# Patient Record
Sex: MALE | Race: WHITE | Employment: OTHER | URBAN - METROPOLITAN AREA
[De-identification: names, ages, dates, MRNs, and addresses within clinical notes are randomized per-mention and may not be internally consistent; named-entity substitution may affect disease eponyms.]

---

## 2020-01-01 ENCOUNTER — TELEPHONE (OUTPATIENT)
Dept: ONCOLOGY | Age: 85
End: 2020-01-01

## 2020-01-01 ENCOUNTER — APPOINTMENT (OUTPATIENT)
Dept: MRI IMAGING | Age: 85
DRG: 871 | End: 2020-01-01
Attending: NURSE PRACTITIONER
Payer: MEDICARE

## 2020-01-01 ENCOUNTER — APPOINTMENT (OUTPATIENT)
Dept: CT IMAGING | Age: 85
DRG: 871 | End: 2020-01-01
Attending: NURSE PRACTITIONER
Payer: MEDICARE

## 2020-01-01 ENCOUNTER — APPOINTMENT (OUTPATIENT)
Dept: GENERAL RADIOLOGY | Age: 85
DRG: 871 | End: 2020-01-01
Attending: STUDENT IN AN ORGANIZED HEALTH CARE EDUCATION/TRAINING PROGRAM
Payer: MEDICARE

## 2020-01-01 ENCOUNTER — HOSPITAL ENCOUNTER (INPATIENT)
Age: 85
LOS: 12 days | Discharge: NURSING, REHAB OR LONG-TERM CARE | DRG: 871 | End: 2020-09-08
Attending: EMERGENCY MEDICINE | Admitting: INTERNAL MEDICINE
Payer: MEDICARE

## 2020-01-01 ENCOUNTER — APPOINTMENT (OUTPATIENT)
Dept: CT IMAGING | Age: 85
DRG: 871 | End: 2020-01-01
Attending: STUDENT IN AN ORGANIZED HEALTH CARE EDUCATION/TRAINING PROGRAM
Payer: MEDICARE

## 2020-01-01 ENCOUNTER — OFFICE VISIT (OUTPATIENT)
Dept: NEUROLOGY | Age: 85
End: 2020-01-01

## 2020-01-01 VITALS
TEMPERATURE: 97.8 F | HEART RATE: 99 BPM | WEIGHT: 195 LBS | DIASTOLIC BLOOD PRESSURE: 69 MMHG | SYSTOLIC BLOOD PRESSURE: 135 MMHG | RESPIRATION RATE: 20 BRPM | OXYGEN SATURATION: 95 % | HEIGHT: 71 IN | BODY MASS INDEX: 27.3 KG/M2

## 2020-01-01 VITALS
OXYGEN SATURATION: 97 % | HEART RATE: 70 BPM | DIASTOLIC BLOOD PRESSURE: 78 MMHG | SYSTOLIC BLOOD PRESSURE: 124 MMHG | HEIGHT: 71 IN

## 2020-01-01 DIAGNOSIS — M89.9 LESION OF LUMBAR SPINE: ICD-10-CM

## 2020-01-01 DIAGNOSIS — R10.31 ABDOMINAL PAIN, RIGHT LOWER QUADRANT: ICD-10-CM

## 2020-01-01 DIAGNOSIS — M89.9 LYTIC BONE LESIONS ON XRAY: Primary | ICD-10-CM

## 2020-01-01 DIAGNOSIS — N28.89 RIGHT RENAL MASS: ICD-10-CM

## 2020-01-01 DIAGNOSIS — G60.0 CHARCOT-MARIE-TOOTH DISEASE TYPE 2: ICD-10-CM

## 2020-01-01 DIAGNOSIS — A41.9 SEPSIS, DUE TO UNSPECIFIED ORGANISM, UNSPECIFIED WHETHER ACUTE ORGAN DYSFUNCTION PRESENT (HCC): ICD-10-CM

## 2020-01-01 DIAGNOSIS — R53.1 LEFT-SIDED WEAKNESS: ICD-10-CM

## 2020-01-01 DIAGNOSIS — N28.89 KIDNEY MASS: ICD-10-CM
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
## 2020-01-01 DIAGNOSIS — R50.9 FEVER, UNSPECIFIED FEVER CAUSE: ICD-10-CM

## 2020-01-01 DIAGNOSIS — N30.01 ACUTE CYSTITIS WITH HEMATURIA: ICD-10-CM

## 2020-01-01 DIAGNOSIS — J18.9 PNEUMONIA OF BOTH LUNGS DUE TO INFECTIOUS ORGANISM, UNSPECIFIED PART OF LUNG: ICD-10-CM

## 2020-01-01 DIAGNOSIS — N28.89 RENAL MASS, RIGHT: ICD-10-CM

## 2020-01-01 DIAGNOSIS — G60.0 CMT (CHARCOT-MARIE-TOOTH DISEASE): Primary | ICD-10-CM

## 2020-01-01 DIAGNOSIS — M89.9 BONE LESION: ICD-10-CM

## 2020-01-01 DIAGNOSIS — R16.0 LIVER MASS: ICD-10-CM

## 2020-01-01 DIAGNOSIS — J18.9 COMMUNITY ACQUIRED BILATERAL LOWER LOBE PNEUMONIA: Primary | ICD-10-CM

## 2020-01-01 DIAGNOSIS — N28.89 RENAL MASS: ICD-10-CM

## 2020-01-01 LAB
ALBUMIN SERPL-MCNC: 1.9 G/DL (ref 3.5–5)
ALBUMIN SERPL-MCNC: 2.3 G/DL (ref 3.5–5)
ALBUMIN SERPL-MCNC: 2.3 G/DL (ref 3.5–5)
ALBUMIN SERPL-MCNC: 2.6 G/DL (ref 3.5–5)
ALBUMIN/GLOB SERPL: 0.4 {RATIO} (ref 1.1–2.2)
ALBUMIN/GLOB SERPL: 0.5 {RATIO} (ref 1.1–2.2)
ALP SERPL-CCNC: 117 U/L (ref 45–117)
ALP SERPL-CCNC: 118 U/L (ref 45–117)
ALP SERPL-CCNC: 133 U/L (ref 45–117)
ALP SERPL-CCNC: 137 U/L (ref 45–117)
ALT SERPL-CCNC: 37 U/L (ref 12–78)
ALT SERPL-CCNC: 38 U/L (ref 12–78)
ALT SERPL-CCNC: 59 U/L (ref 12–78)
ALT SERPL-CCNC: 61 U/L (ref 12–78)
ANION GAP SERPL CALC-SCNC: 10 MMOL/L (ref 5–15)
ANION GAP SERPL CALC-SCNC: 11 MMOL/L (ref 5–15)
ANION GAP SERPL CALC-SCNC: 7 MMOL/L (ref 5–15)
ANION GAP SERPL CALC-SCNC: 8 MMOL/L (ref 5–15)
ANION GAP SERPL CALC-SCNC: 9 MMOL/L (ref 5–15)
ANION GAP SERPL CALC-SCNC: 9 MMOL/L (ref 5–15)
APPEARANCE UR: ABNORMAL
AST SERPL-CCNC: 43 U/L (ref 15–37)
AST SERPL-CCNC: 47 U/L (ref 15–37)
AST SERPL-CCNC: 47 U/L (ref 15–37)
AST SERPL-CCNC: 88 U/L (ref 15–37)
ATRIAL RATE: 114 BPM
B PERT DNA SPEC QL NAA+PROBE: NOT DETECTED
BACTERIA SPEC CULT: ABNORMAL
BACTERIA SPEC CULT: NORMAL
BACTERIA URNS QL MICRO: ABNORMAL /HPF
BASOPHILS # BLD: 0 K/UL (ref 0–0.1)
BASOPHILS # BLD: 0.1 K/UL (ref 0–0.1)
BASOPHILS # BLD: 0.1 K/UL (ref 0–0.1)
BASOPHILS NFR BLD: 0 % (ref 0–1)
BILIRUB SERPL-MCNC: 1.2 MG/DL (ref 0.2–1)
BILIRUB SERPL-MCNC: 1.5 MG/DL (ref 0.2–1)
BILIRUB SERPL-MCNC: 1.8 MG/DL (ref 0.2–1)
BILIRUB SERPL-MCNC: 1.9 MG/DL (ref 0.2–1)
BILIRUB UR QL: NEGATIVE
BORDETELLA PARAPERTUSSIS PCR, BORPAR: NOT DETECTED
BUN SERPL-MCNC: 23 MG/DL (ref 6–20)
BUN SERPL-MCNC: 28 MG/DL (ref 6–20)
BUN SERPL-MCNC: 30 MG/DL (ref 6–20)
BUN SERPL-MCNC: 33 MG/DL (ref 6–20)
BUN SERPL-MCNC: 37 MG/DL (ref 6–20)
BUN SERPL-MCNC: 44 MG/DL (ref 6–20)
BUN/CREAT SERPL: 38 (ref 12–20)
BUN/CREAT SERPL: 40 (ref 12–20)
BUN/CREAT SERPL: 43 (ref 12–20)
BUN/CREAT SERPL: 44 (ref 12–20)
BUN/CREAT SERPL: 44 (ref 12–20)
BUN/CREAT SERPL: 47 (ref 12–20)
C PNEUM DNA SPEC QL NAA+PROBE: NOT DETECTED
CALCIUM SERPL-MCNC: 8.2 MG/DL (ref 8.5–10.1)
CALCIUM SERPL-MCNC: 8.2 MG/DL (ref 8.5–10.1)
CALCIUM SERPL-MCNC: 8.4 MG/DL (ref 8.5–10.1)
CALCIUM SERPL-MCNC: 8.4 MG/DL (ref 8.5–10.1)
CALCIUM SERPL-MCNC: 8.5 MG/DL (ref 8.5–10.1)
CALCIUM SERPL-MCNC: 8.9 MG/DL (ref 8.5–10.1)
CALCULATED P AXIS, ECG09: 30 DEGREES
CALCULATED R AXIS, ECG10: -57 DEGREES
CALCULATED T AXIS, ECG11: 87 DEGREES
CC UR VC: ABNORMAL
CHLORIDE SERPL-SCNC: 103 MMOL/L (ref 97–108)
CHLORIDE SERPL-SCNC: 105 MMOL/L (ref 97–108)
CHLORIDE SERPL-SCNC: 107 MMOL/L (ref 97–108)
CHLORIDE SERPL-SCNC: 108 MMOL/L (ref 97–108)
CHLORIDE SERPL-SCNC: 109 MMOL/L (ref 97–108)
CHLORIDE SERPL-SCNC: 110 MMOL/L (ref 97–108)
CK SERPL-CCNC: 73 U/L (ref 39–308)
CO2 SERPL-SCNC: 20 MMOL/L (ref 21–32)
CO2 SERPL-SCNC: 20 MMOL/L (ref 21–32)
CO2 SERPL-SCNC: 21 MMOL/L (ref 21–32)
CO2 SERPL-SCNC: 23 MMOL/L (ref 21–32)
COLOR UR: ABNORMAL
COMMENT, HOLDF: NORMAL
COVID-19, XGCOVT: NOT DETECTED
COVID-19, XGCOVT: NOT DETECTED
CREAT SERPL-MCNC: 0.52 MG/DL (ref 0.7–1.3)
CREAT SERPL-MCNC: 0.6 MG/DL (ref 0.7–1.3)
CREAT SERPL-MCNC: 0.68 MG/DL (ref 0.7–1.3)
CREAT SERPL-MCNC: 0.82 MG/DL (ref 0.7–1.3)
CREAT SERPL-MCNC: 0.86 MG/DL (ref 0.7–1.3)
CREAT SERPL-MCNC: 1.16 MG/DL (ref 0.7–1.3)
DIAGNOSIS, 93000: NORMAL
DIFFERENTIAL METHOD BLD: ABNORMAL
EOSINOPHIL # BLD: 0 K/UL (ref 0–0.4)
EOSINOPHIL NFR BLD: 0 % (ref 0–7)
EPITH CASTS URNS QL MICRO: ABNORMAL /LPF
ERYTHROCYTE [DISTWIDTH] IN BLOOD BY AUTOMATED COUNT: 14.4 % (ref 11.5–14.5)
ERYTHROCYTE [DISTWIDTH] IN BLOOD BY AUTOMATED COUNT: 14.6 % (ref 11.5–14.5)
ERYTHROCYTE [DISTWIDTH] IN BLOOD BY AUTOMATED COUNT: 14.6 % (ref 11.5–14.5)
ERYTHROCYTE [DISTWIDTH] IN BLOOD BY AUTOMATED COUNT: 14.7 % (ref 11.5–14.5)
ERYTHROCYTE [DISTWIDTH] IN BLOOD BY AUTOMATED COUNT: 14.8 % (ref 11.5–14.5)
ERYTHROCYTE [DISTWIDTH] IN BLOOD BY AUTOMATED COUNT: 15.1 % (ref 11.5–14.5)
ERYTHROCYTE [DISTWIDTH] IN BLOOD BY AUTOMATED COUNT: 15.3 % (ref 11.5–14.5)
FLUAV H1 2009 PAND RNA SPEC QL NAA+PROBE: NOT DETECTED
FLUAV H1 RNA SPEC QL NAA+PROBE: NOT DETECTED
FLUAV H3 RNA SPEC QL NAA+PROBE: NOT DETECTED
FLUAV SUBTYP SPEC NAA+PROBE: NOT DETECTED
FLUBV RNA SPEC QL NAA+PROBE: NOT DETECTED
FLUID CULTURE, SPNG2: NORMAL
GLOBULIN SER CALC-MCNC: 4.3 G/DL (ref 2–4)
GLOBULIN SER CALC-MCNC: 4.4 G/DL (ref 2–4)
GLOBULIN SER CALC-MCNC: 4.8 G/DL (ref 2–4)
GLOBULIN SER CALC-MCNC: 5.1 G/DL (ref 2–4)
GLUCOSE SERPL-MCNC: 102 MG/DL (ref 65–100)
GLUCOSE SERPL-MCNC: 121 MG/DL (ref 65–100)
GLUCOSE SERPL-MCNC: 123 MG/DL (ref 65–100)
GLUCOSE SERPL-MCNC: 132 MG/DL (ref 65–100)
GLUCOSE SERPL-MCNC: 133 MG/DL (ref 65–100)
GLUCOSE SERPL-MCNC: 139 MG/DL (ref 65–100)
GLUCOSE UR STRIP.AUTO-MCNC: NEGATIVE MG/DL
GRAM STN SPEC: NORMAL
HADV DNA SPEC QL NAA+PROBE: NOT DETECTED
HCOV 229E RNA SPEC QL NAA+PROBE: NOT DETECTED
HCOV HKU1 RNA SPEC QL NAA+PROBE: NOT DETECTED
HCOV NL63 RNA SPEC QL NAA+PROBE: NOT DETECTED
HCOV OC43 RNA SPEC QL NAA+PROBE: NOT DETECTED
HCT VFR BLD AUTO: 38 % (ref 36.6–50.3)
HCT VFR BLD AUTO: 38.5 % (ref 36.6–50.3)
HCT VFR BLD AUTO: 40.8 % (ref 36.6–50.3)
HCT VFR BLD AUTO: 40.9 % (ref 36.6–50.3)
HCT VFR BLD AUTO: 41.6 % (ref 36.6–50.3)
HCT VFR BLD AUTO: 44.3 % (ref 36.6–50.3)
HCT VFR BLD AUTO: 44.5 % (ref 36.6–50.3)
HEALTH STATUS, XMCV2T: NORMAL
HGB BLD-MCNC: 12.8 G/DL (ref 12.1–17)
HGB BLD-MCNC: 12.9 G/DL (ref 12.1–17)
HGB BLD-MCNC: 13.5 G/DL (ref 12.1–17)
HGB BLD-MCNC: 13.7 G/DL (ref 12.1–17)
HGB BLD-MCNC: 14 G/DL (ref 12.1–17)
HGB BLD-MCNC: 14.8 G/DL (ref 12.1–17)
HGB BLD-MCNC: 15.3 G/DL (ref 12.1–17)
HGB UR QL STRIP: ABNORMAL
HMPV RNA SPEC QL NAA+PROBE: NOT DETECTED
HPIV1 RNA SPEC QL NAA+PROBE: NOT DETECTED
HPIV2 RNA SPEC QL NAA+PROBE: NOT DETECTED
HPIV3 RNA SPEC QL NAA+PROBE: NOT DETECTED
HPIV4 RNA SPEC QL NAA+PROBE: NOT DETECTED
IMM GRANULOCYTES # BLD AUTO: 0 K/UL
IMM GRANULOCYTES # BLD AUTO: 0.3 K/UL (ref 0–0.04)
IMM GRANULOCYTES # BLD AUTO: 0.3 K/UL (ref 0–0.04)
IMM GRANULOCYTES NFR BLD AUTO: 0 %
IMM GRANULOCYTES NFR BLD AUTO: 2 % (ref 0–0.5)
IMM GRANULOCYTES NFR BLD AUTO: 2 % (ref 0–0.5)
KETONES UR QL STRIP.AUTO: 80 MG/DL
L PNEUMO1 AG UR QL IA: NEGATIVE
LACTATE BLD-SCNC: 1.83 MMOL/L (ref 0.4–2)
LEUKOCYTE ESTERASE UR QL STRIP.AUTO: ABNORMAL
LIPASE SERPL-CCNC: 22 U/L (ref 73–393)
LYMPHOCYTES # BLD: 0.7 K/UL (ref 0.8–3.5)
LYMPHOCYTES # BLD: 0.9 K/UL (ref 0.8–3.5)
LYMPHOCYTES # BLD: 0.9 K/UL (ref 0.8–3.5)
LYMPHOCYTES # BLD: 1 K/UL (ref 0.8–3.5)
LYMPHOCYTES # BLD: 1 K/UL (ref 0.8–3.5)
LYMPHOCYTES # BLD: 1.1 K/UL (ref 0.8–3.5)
LYMPHOCYTES # BLD: 1.1 K/UL (ref 0.8–3.5)
LYMPHOCYTES NFR BLD: 4 % (ref 12–49)
LYMPHOCYTES NFR BLD: 5 % (ref 12–49)
LYMPHOCYTES NFR BLD: 6 % (ref 12–49)
LYMPHOCYTES NFR BLD: 8 % (ref 12–49)
LYMPHOCYTES NFR BLD: 9 % (ref 12–49)
M PNEUMO DNA SPEC QL NAA+PROBE: NOT DETECTED
M PNEUMO IGM SER IA-ACNC: NONREACTIVE
MAGNESIUM SERPL-MCNC: 2.4 MG/DL (ref 1.6–2.4)
MCH RBC QN AUTO: 30.8 PG (ref 26–34)
MCH RBC QN AUTO: 31.1 PG (ref 26–34)
MCH RBC QN AUTO: 31.2 PG (ref 26–34)
MCH RBC QN AUTO: 31.2 PG (ref 26–34)
MCH RBC QN AUTO: 31.3 PG (ref 26–34)
MCH RBC QN AUTO: 31.6 PG (ref 26–34)
MCH RBC QN AUTO: 31.6 PG (ref 26–34)
MCHC RBC AUTO-ENTMCNC: 33 G/DL (ref 30–36.5)
MCHC RBC AUTO-ENTMCNC: 33.4 G/DL (ref 30–36.5)
MCHC RBC AUTO-ENTMCNC: 33.5 G/DL (ref 30–36.5)
MCHC RBC AUTO-ENTMCNC: 33.6 G/DL (ref 30–36.5)
MCHC RBC AUTO-ENTMCNC: 33.7 G/DL (ref 30–36.5)
MCHC RBC AUTO-ENTMCNC: 33.7 G/DL (ref 30–36.5)
MCHC RBC AUTO-ENTMCNC: 34.4 G/DL (ref 30–36.5)
MCV RBC AUTO: 91.9 FL (ref 80–99)
MCV RBC AUTO: 92.5 FL (ref 80–99)
MCV RBC AUTO: 92.9 FL (ref 80–99)
MCV RBC AUTO: 92.9 FL (ref 80–99)
MCV RBC AUTO: 93.2 FL (ref 80–99)
MCV RBC AUTO: 93.5 FL (ref 80–99)
MCV RBC AUTO: 94.4 FL (ref 80–99)
MONOCYTES # BLD: 0.3 K/UL (ref 0–1)
MONOCYTES # BLD: 0.5 K/UL (ref 0–1)
MONOCYTES # BLD: 0.8 K/UL (ref 0–1)
MONOCYTES # BLD: 0.9 K/UL (ref 0–1)
MONOCYTES # BLD: 1.2 K/UL (ref 0–1)
MONOCYTES # BLD: 1.4 K/UL (ref 0–1)
MONOCYTES # BLD: 1.7 K/UL (ref 0–1)
MONOCYTES NFR BLD: 10 % (ref 5–13)
MONOCYTES NFR BLD: 10 % (ref 5–13)
MONOCYTES NFR BLD: 2 % (ref 5–13)
MONOCYTES NFR BLD: 3 % (ref 5–13)
MONOCYTES NFR BLD: 4 % (ref 5–13)
MONOCYTES NFR BLD: 6 % (ref 5–13)
MONOCYTES NFR BLD: 8 % (ref 5–13)
MYELOCYTES NFR BLD MANUAL: 1 %
NEUTS BAND NFR BLD MANUAL: 1 % (ref 0–6)
NEUTS BAND NFR BLD MANUAL: 1 % (ref 0–6)
NEUTS BAND NFR BLD MANUAL: 3 % (ref 0–6)
NEUTS BAND NFR BLD MANUAL: 3 % (ref 0–6)
NEUTS BAND NFR BLD MANUAL: 6 % (ref 0–6)
NEUTS SEG # BLD: 11.9 K/UL (ref 1.8–8)
NEUTS SEG # BLD: 13.1 K/UL (ref 1.8–8)
NEUTS SEG # BLD: 13.5 K/UL (ref 1.8–8)
NEUTS SEG # BLD: 14.4 K/UL (ref 1.8–8)
NEUTS SEG # BLD: 17 K/UL (ref 1.8–8)
NEUTS SEG # BLD: 17.1 K/UL (ref 1.8–8)
NEUTS SEG # BLD: 9.4 K/UL (ref 1.8–8)
NEUTS SEG NFR BLD: 80 % (ref 32–75)
NEUTS SEG NFR BLD: 82 % (ref 32–75)
NEUTS SEG NFR BLD: 85 % (ref 32–75)
NEUTS SEG NFR BLD: 86 % (ref 32–75)
NEUTS SEG NFR BLD: 87 % (ref 32–75)
NITRITE UR QL STRIP.AUTO: POSITIVE
NRBC # BLD: 0 K/UL (ref 0–0.01)
NRBC BLD-RTO: 0 PER 100 WBC
ORGANISM ID, SPNG3: NORMAL
P-R INTERVAL, ECG05: 128 MS
PH UR STRIP: 5.5 [PH] (ref 5–8)
PHOSPHATE SERPL-MCNC: 2.8 MG/DL (ref 2.6–4.7)
PLATELET # BLD AUTO: 112 K/UL (ref 150–400)
PLATELET # BLD AUTO: 121 K/UL (ref 150–400)
PLATELET # BLD AUTO: 160 K/UL (ref 150–400)
PLATELET # BLD AUTO: 266 K/UL (ref 150–400)
PLATELET # BLD AUTO: 395 K/UL (ref 150–400)
PLATELET # BLD AUTO: 480 K/UL (ref 150–400)
PLATELET # BLD AUTO: 99 K/UL (ref 150–400)
PLEASE NOTE, SPNG4: NORMAL
PMV BLD AUTO: 10.1 FL (ref 8.9–12.9)
PMV BLD AUTO: 10.2 FL (ref 8.9–12.9)
PMV BLD AUTO: 11 FL (ref 8.9–12.9)
PMV BLD AUTO: 11.6 FL (ref 8.9–12.9)
PMV BLD AUTO: 12 FL (ref 8.9–12.9)
PMV BLD AUTO: 12.2 FL (ref 8.9–12.9)
PMV BLD AUTO: 12.2 FL (ref 8.9–12.9)
POTASSIUM SERPL-SCNC: 3.3 MMOL/L (ref 3.5–5.1)
POTASSIUM SERPL-SCNC: 3.4 MMOL/L (ref 3.5–5.1)
POTASSIUM SERPL-SCNC: 3.5 MMOL/L (ref 3.5–5.1)
POTASSIUM SERPL-SCNC: 3.5 MMOL/L (ref 3.5–5.1)
POTASSIUM SERPL-SCNC: 3.8 MMOL/L (ref 3.5–5.1)
POTASSIUM SERPL-SCNC: 3.9 MMOL/L (ref 3.5–5.1)
PROT SERPL-MCNC: 6.2 G/DL (ref 6.4–8.2)
PROT SERPL-MCNC: 6.7 G/DL (ref 6.4–8.2)
PROT SERPL-MCNC: 7.1 G/DL (ref 6.4–8.2)
PROT SERPL-MCNC: 7.7 G/DL (ref 6.4–8.2)
PROT UR STRIP-MCNC: 100 MG/DL
PSA SERPL-MCNC: 4.6 NG/ML (ref 0.01–4)
Q-T INTERVAL, ECG07: 326 MS
QRS DURATION, ECG06: 90 MS
QTC CALCULATION (BEZET), ECG08: 449 MS
RBC # BLD AUTO: 4.08 M/UL (ref 4.1–5.7)
RBC # BLD AUTO: 4.11 M/UL (ref 4.1–5.7)
RBC # BLD AUTO: 4.39 M/UL (ref 4.1–5.7)
RBC # BLD AUTO: 4.39 M/UL (ref 4.1–5.7)
RBC # BLD AUTO: 4.48 M/UL (ref 4.1–5.7)
RBC # BLD AUTO: 4.74 M/UL (ref 4.1–5.7)
RBC # BLD AUTO: 4.84 M/UL (ref 4.1–5.7)
RBC #/AREA URNS HPF: ABNORMAL /HPF (ref 0–5)
RBC MORPH BLD: ABNORMAL
RSV RNA SPEC QL NAA+PROBE: NOT DETECTED
RV+EV RNA SPEC QL NAA+PROBE: NOT DETECTED
S PNEUM AG SPEC QL LA: NEGATIVE
SAMPLES BEING HELD,HOLD: NORMAL
SARS-COV-2, COV2: NOT DETECTED
SARS-COV-2, COV2: NOT DETECTED
SERVICE CMNT-IMP: ABNORMAL
SERVICE CMNT-IMP: NORMAL
SODIUM SERPL-SCNC: 134 MMOL/L (ref 136–145)
SODIUM SERPL-SCNC: 135 MMOL/L (ref 136–145)
SODIUM SERPL-SCNC: 138 MMOL/L (ref 136–145)
SODIUM SERPL-SCNC: 139 MMOL/L (ref 136–145)
SODIUM SERPL-SCNC: 140 MMOL/L (ref 136–145)
SODIUM SERPL-SCNC: 140 MMOL/L (ref 136–145)
SOURCE, COVRS: NORMAL
SP GR UR REFRACTOMETRY: 1.02 (ref 1–1.03)
SPECIMEN SOURCE, FCOV2M: NORMAL
SPECIMEN SOURCE: NORMAL
SPECIMEN SOURCE: NORMAL
SPECIMEN TYPE, XMCV1T: NORMAL
SPECIMEN, SPNG1: NORMAL
TROPONIN I SERPL-MCNC: <0.05 NG/ML
UR CULT HOLD, URHOLD: NORMAL
UROBILINOGEN UR QL STRIP.AUTO: 1 EU/DL (ref 0.2–1)
VENTRICULAR RATE, ECG03: 114 BPM
WBC # BLD AUTO: 11.6 K/UL (ref 4.1–11.1)
WBC # BLD AUTO: 13.3 K/UL (ref 4.1–11.1)
WBC # BLD AUTO: 15.1 K/UL (ref 4.1–11.1)
WBC # BLD AUTO: 16.5 K/UL (ref 4.1–11.1)
WBC # BLD AUTO: 17.1 K/UL (ref 4.1–11.1)
WBC # BLD AUTO: 18.3 K/UL (ref 4.1–11.1)
WBC # BLD AUTO: 18.9 K/UL (ref 4.1–11.1)
WBC URNS QL MICRO: ABNORMAL /HPF (ref 0–4)

## 2020-01-01 PROCEDURE — 65660000000 HC RM CCU STEPDOWN

## 2020-01-01 PROCEDURE — 74170 CT ABD WO CNTRST FLWD CNTRST: CPT

## 2020-01-01 PROCEDURE — 74011000250 HC RX REV CODE- 250: Performed by: INTERNAL MEDICINE

## 2020-01-01 PROCEDURE — 96365 THER/PROPH/DIAG IV INF INIT: CPT

## 2020-01-01 PROCEDURE — 87635 SARS-COV-2 COVID-19 AMP PRB: CPT

## 2020-01-01 PROCEDURE — 97530 THERAPEUTIC ACTIVITIES: CPT

## 2020-01-01 PROCEDURE — 77030040831 HC BAG URINE DRNG MDII -A

## 2020-01-01 PROCEDURE — 74011250637 HC RX REV CODE- 250/637: Performed by: NURSE PRACTITIONER

## 2020-01-01 PROCEDURE — 77030041074 HC DRSG AG OPTIFRM MDII -A

## 2020-01-01 PROCEDURE — 87040 BLOOD CULTURE FOR BACTERIA: CPT

## 2020-01-01 PROCEDURE — 83605 ASSAY OF LACTIC ACID: CPT

## 2020-01-01 PROCEDURE — 87070 CULTURE OTHR SPECIMN AEROBIC: CPT

## 2020-01-01 PROCEDURE — 74011000258 HC RX REV CODE- 258: Performed by: INTERNAL MEDICINE

## 2020-01-01 PROCEDURE — 71045 X-RAY EXAM CHEST 1 VIEW: CPT

## 2020-01-01 PROCEDURE — 87449 NOS EACH ORGANISM AG IA: CPT

## 2020-01-01 PROCEDURE — 85025 COMPLETE CBC W/AUTO DIFF WBC: CPT

## 2020-01-01 PROCEDURE — 74011250637 HC RX REV CODE- 250/637: Performed by: INTERNAL MEDICINE

## 2020-01-01 PROCEDURE — 74011250636 HC RX REV CODE- 250/636: Performed by: INTERNAL MEDICINE

## 2020-01-01 PROCEDURE — 80053 COMPREHEN METABOLIC PANEL: CPT

## 2020-01-01 PROCEDURE — 74011250637 HC RX REV CODE- 250/637: Performed by: FAMILY MEDICINE

## 2020-01-01 PROCEDURE — 99285 EMERGENCY DEPT VISIT HI MDM: CPT

## 2020-01-01 PROCEDURE — 77030038269 HC DRN EXT URIN PURWCK BARD -A

## 2020-01-01 PROCEDURE — 36415 COLL VENOUS BLD VENIPUNCTURE: CPT

## 2020-01-01 PROCEDURE — 80048 BASIC METABOLIC PNL TOTAL CA: CPT

## 2020-01-01 PROCEDURE — 97165 OT EVAL LOW COMPLEX 30 MIN: CPT

## 2020-01-01 PROCEDURE — 99232 SBSQ HOSP IP/OBS MODERATE 35: CPT | Performed by: INTERNAL MEDICINE

## 2020-01-01 PROCEDURE — 74011000636 HC RX REV CODE- 636: Performed by: RADIOLOGY

## 2020-01-01 PROCEDURE — 87899 AGENT NOS ASSAY W/OPTIC: CPT

## 2020-01-01 PROCEDURE — 81001 URINALYSIS AUTO W/SCOPE: CPT

## 2020-01-01 PROCEDURE — 65270000029 HC RM PRIVATE

## 2020-01-01 PROCEDURE — 83690 ASSAY OF LIPASE: CPT

## 2020-01-01 PROCEDURE — 77010033678 HC OXYGEN DAILY

## 2020-01-01 PROCEDURE — 82550 ASSAY OF CK (CPK): CPT

## 2020-01-01 PROCEDURE — 83735 ASSAY OF MAGNESIUM: CPT

## 2020-01-01 PROCEDURE — 87086 URINE CULTURE/COLONY COUNT: CPT

## 2020-01-01 PROCEDURE — 84484 ASSAY OF TROPONIN QUANT: CPT

## 2020-01-01 PROCEDURE — 70450 CT HEAD/BRAIN W/O DYE: CPT

## 2020-01-01 PROCEDURE — 74011250636 HC RX REV CODE- 250/636: Performed by: STUDENT IN AN ORGANIZED HEALTH CARE EDUCATION/TRAINING PROGRAM

## 2020-01-01 PROCEDURE — 84100 ASSAY OF PHOSPHORUS: CPT

## 2020-01-01 PROCEDURE — 97162 PT EVAL MOD COMPLEX 30 MIN: CPT

## 2020-01-01 PROCEDURE — 99223 1ST HOSP IP/OBS HIGH 75: CPT | Performed by: INTERNAL MEDICINE

## 2020-01-01 PROCEDURE — 74011000258 HC RX REV CODE- 258: Performed by: STUDENT IN AN ORGANIZED HEALTH CARE EDUCATION/TRAINING PROGRAM

## 2020-01-01 PROCEDURE — 84153 ASSAY OF PSA TOTAL: CPT

## 2020-01-01 PROCEDURE — 86738 MYCOPLASMA ANTIBODY: CPT

## 2020-01-01 PROCEDURE — 0100U RESPIRATORY PANEL,PCR,NASOPHARYNGEAL: CPT

## 2020-01-01 PROCEDURE — 77030041247 HC PROTECTOR HEEL HEELMEDIX MDII -B

## 2020-01-01 PROCEDURE — 93005 ELECTROCARDIOGRAM TRACING: CPT

## 2020-01-01 PROCEDURE — 87186 SC STD MICRODIL/AGAR DIL: CPT

## 2020-01-01 PROCEDURE — 71260 CT THORAX DX C+: CPT

## 2020-01-01 PROCEDURE — 97110 THERAPEUTIC EXERCISES: CPT

## 2020-01-01 PROCEDURE — 87077 CULTURE AEROBIC IDENTIFY: CPT

## 2020-01-01 RX ORDER — AMOXICILLIN 250 MG
1 CAPSULE ORAL
Status: DISCONTINUED | OUTPATIENT
Start: 2020-01-01 | End: 2020-01-01 | Stop reason: HOSPADM

## 2020-01-01 RX ORDER — POTASSIUM CHLORIDE 750 MG/1
40 TABLET, FILM COATED, EXTENDED RELEASE ORAL
Status: COMPLETED | OUTPATIENT
Start: 2020-01-01 | End: 2020-01-01

## 2020-01-01 RX ORDER — ENOXAPARIN SODIUM 100 MG/ML
40 INJECTION SUBCUTANEOUS DAILY
Status: DISCONTINUED | OUTPATIENT
Start: 2020-01-01 | End: 2020-01-01

## 2020-01-01 RX ORDER — ACETAMINOPHEN 650 MG/1
650 SUPPOSITORY RECTAL
Status: DISCONTINUED | OUTPATIENT
Start: 2020-01-01 | End: 2020-01-01

## 2020-01-01 RX ORDER — LEVOFLOXACIN 5 MG/ML
750 INJECTION, SOLUTION INTRAVENOUS ONCE
Status: COMPLETED | OUTPATIENT
Start: 2020-01-01 | End: 2020-01-01

## 2020-01-01 RX ORDER — POLYETHYLENE GLYCOL 3350 17 G/17G
17 POWDER, FOR SOLUTION ORAL DAILY PRN
Status: DISCONTINUED | OUTPATIENT
Start: 2020-01-01 | End: 2020-01-01 | Stop reason: HOSPADM

## 2020-01-01 RX ORDER — LOVASTATIN 20 MG/1
20 TABLET ORAL
COMMUNITY
End: 2020-01-01

## 2020-01-01 RX ORDER — LEVOFLOXACIN 750 MG/1
750 TABLET ORAL DAILY
Qty: 2 TAB | Refills: 0 | Status: SHIPPED | OUTPATIENT
Start: 2020-01-01 | End: 2020-01-01 | Stop reason: SDUPTHER

## 2020-01-01 RX ORDER — LEVOFLOXACIN 750 MG/1
750 TABLET ORAL DAILY
Qty: 2 TAB | Refills: 0 | Status: SHIPPED | OUTPATIENT
Start: 2020-01-01

## 2020-01-01 RX ORDER — ONDANSETRON 2 MG/ML
4 INJECTION INTRAMUSCULAR; INTRAVENOUS
Status: DISCONTINUED | OUTPATIENT
Start: 2020-01-01 | End: 2020-01-01 | Stop reason: HOSPADM

## 2020-01-01 RX ORDER — ENOXAPARIN SODIUM 100 MG/ML
40 INJECTION SUBCUTANEOUS EVERY 24 HOURS
Status: DISCONTINUED | OUTPATIENT
Start: 2020-01-01 | End: 2020-01-01 | Stop reason: HOSPADM

## 2020-01-01 RX ORDER — LEVOFLOXACIN 750 MG/1
750 TABLET ORAL DAILY
Qty: 3 TAB | Refills: 0 | Status: SHIPPED | OUTPATIENT
Start: 2020-01-01 | End: 2020-01-01 | Stop reason: SDUPTHER

## 2020-01-01 RX ORDER — ACETAMINOPHEN 325 MG/1
650 TABLET ORAL
Status: DISCONTINUED | OUTPATIENT
Start: 2020-01-01 | End: 2020-01-01 | Stop reason: HOSPADM

## 2020-01-01 RX ORDER — CHOLECALCIFEROL (VITAMIN D3) 125 MCG
CAPSULE ORAL
COMMUNITY
End: 2020-01-01

## 2020-01-01 RX ORDER — FUROSEMIDE 10 MG/ML
40 INJECTION INTRAMUSCULAR; INTRAVENOUS ONCE
Status: COMPLETED | OUTPATIENT
Start: 2020-01-01 | End: 2020-01-01

## 2020-01-01 RX ORDER — PROMETHAZINE HYDROCHLORIDE 25 MG/1
12.5 TABLET ORAL
Status: DISCONTINUED | OUTPATIENT
Start: 2020-01-01 | End: 2020-01-01

## 2020-01-01 RX ORDER — DOCUSATE SODIUM 250 MG
250 CAPSULE ORAL DAILY
COMMUNITY
End: 2020-01-01

## 2020-01-01 RX ORDER — SODIUM CHLORIDE 0.9 % (FLUSH) 0.9 %
5-40 SYRINGE (ML) INJECTION AS NEEDED
Status: DISCONTINUED | OUTPATIENT
Start: 2020-01-01 | End: 2020-01-01 | Stop reason: HOSPADM

## 2020-01-01 RX ORDER — POTASSIUM CHLORIDE 750 MG/1
40 TABLET, FILM COATED, EXTENDED RELEASE ORAL EVERY 4 HOURS
Status: COMPLETED | OUTPATIENT
Start: 2020-01-01 | End: 2020-01-01

## 2020-01-01 RX ORDER — SODIUM CHLORIDE 0.9 % (FLUSH) 0.9 %
5-10 SYRINGE (ML) INJECTION AS NEEDED
Status: DISCONTINUED | OUTPATIENT
Start: 2020-01-01 | End: 2020-01-01 | Stop reason: HOSPADM

## 2020-01-01 RX ORDER — ASPIRIN 81 MG/1
81 TABLET ORAL DAILY
COMMUNITY
End: 2020-01-01

## 2020-01-01 RX ORDER — SODIUM CHLORIDE 0.9 % (FLUSH) 0.9 %
5-40 SYRINGE (ML) INJECTION EVERY 8 HOURS
Status: DISCONTINUED | OUTPATIENT
Start: 2020-01-01 | End: 2020-01-01 | Stop reason: HOSPADM

## 2020-01-01 RX ADMIN — Medication 10 ML: at 16:22

## 2020-01-01 RX ADMIN — WATER 2 G: 1 INJECTION INTRAMUSCULAR; INTRAVENOUS; SUBCUTANEOUS at 09:33

## 2020-01-01 RX ADMIN — Medication 10 ML: at 06:35

## 2020-01-01 RX ADMIN — ENOXAPARIN SODIUM 40 MG: 40 INJECTION SUBCUTANEOUS at 09:09

## 2020-01-01 RX ADMIN — DOCUSATE SODIUM 50MG AND SENNOSIDES 8.6MG 1 TABLET: 8.6; 5 TABLET, FILM COATED ORAL at 21:44

## 2020-01-01 RX ADMIN — Medication 10 ML: at 07:26

## 2020-01-01 RX ADMIN — Medication 10 ML: at 04:41

## 2020-01-01 RX ADMIN — WATER 2 G: 1 INJECTION INTRAMUSCULAR; INTRAVENOUS; SUBCUTANEOUS at 09:29

## 2020-01-01 RX ADMIN — Medication 10 ML: at 21:56

## 2020-01-01 RX ADMIN — Medication 10 ML: at 20:44

## 2020-01-01 RX ADMIN — PIPERACILLIN AND TAZOBACTAM 3.38 G: 3; .375 INJECTION, POWDER, LYOPHILIZED, FOR SOLUTION INTRAVENOUS at 04:14

## 2020-01-01 RX ADMIN — LEVOFLOXACIN 750 MG: 500 TABLET, FILM COATED ORAL at 06:51

## 2020-01-01 RX ADMIN — ACETAMINOPHEN 650 MG: 325 TABLET ORAL at 10:21

## 2020-01-01 RX ADMIN — Medication 10 ML: at 21:48

## 2020-01-01 RX ADMIN — FUROSEMIDE 40 MG: 10 INJECTION, SOLUTION INTRAMUSCULAR; INTRAVENOUS at 08:56

## 2020-01-01 RX ADMIN — Medication 20 ML: at 14:00

## 2020-01-01 RX ADMIN — DOCUSATE SODIUM 50MG AND SENNOSIDES 8.6MG 1 TABLET: 8.6; 5 TABLET, FILM COATED ORAL at 20:44

## 2020-01-01 RX ADMIN — DOXYCYCLINE 100 MG: 100 INJECTION, POWDER, LYOPHILIZED, FOR SOLUTION INTRAVENOUS at 20:25

## 2020-01-01 RX ADMIN — Medication 10 ML: at 06:00

## 2020-01-01 RX ADMIN — Medication 1 CAPSULE: at 08:25

## 2020-01-01 RX ADMIN — Medication 10 ML: at 05:22

## 2020-01-01 RX ADMIN — Medication 10 ML: at 13:01

## 2020-01-01 RX ADMIN — DOXYCYCLINE 100 MG: 100 INJECTION, POWDER, LYOPHILIZED, FOR SOLUTION INTRAVENOUS at 09:07

## 2020-01-01 RX ADMIN — DOXYCYCLINE 100 MG: 100 INJECTION, POWDER, LYOPHILIZED, FOR SOLUTION INTRAVENOUS at 21:36

## 2020-01-01 RX ADMIN — ENOXAPARIN SODIUM 40 MG: 40 INJECTION SUBCUTANEOUS at 09:03

## 2020-01-01 RX ADMIN — WATER 2 G: 1 INJECTION INTRAMUSCULAR; INTRAVENOUS; SUBCUTANEOUS at 08:51

## 2020-01-01 RX ADMIN — PIPERACILLIN AND TAZOBACTAM 3.38 G: 3; .375 INJECTION, POWDER, LYOPHILIZED, FOR SOLUTION INTRAVENOUS at 04:08

## 2020-01-01 RX ADMIN — DOCUSATE SODIUM 50MG AND SENNOSIDES 8.6MG 1 TABLET: 8.6; 5 TABLET, FILM COATED ORAL at 20:39

## 2020-01-01 RX ADMIN — PIPERACILLIN AND TAZOBACTAM 3.38 G: 3; .375 INJECTION, POWDER, LYOPHILIZED, FOR SOLUTION INTRAVENOUS at 18:39

## 2020-01-01 RX ADMIN — Medication 10 ML: at 21:40

## 2020-01-01 RX ADMIN — PIPERACILLIN AND TAZOBACTAM 3.38 G: 3; .375 INJECTION, POWDER, LYOPHILIZED, FOR SOLUTION INTRAVENOUS at 04:39

## 2020-01-01 RX ADMIN — IOPAMIDOL 100 ML: 755 INJECTION, SOLUTION INTRAVENOUS at 11:56

## 2020-01-01 RX ADMIN — WATER 2 G: 1 INJECTION INTRAMUSCULAR; INTRAVENOUS; SUBCUTANEOUS at 09:17

## 2020-01-01 RX ADMIN — DOXYCYCLINE 100 MG: 100 INJECTION, POWDER, LYOPHILIZED, FOR SOLUTION INTRAVENOUS at 09:03

## 2020-01-01 RX ADMIN — PIPERACILLIN AND TAZOBACTAM 3.38 G: 3; .375 INJECTION, POWDER, LYOPHILIZED, FOR SOLUTION INTRAVENOUS at 11:58

## 2020-01-01 RX ADMIN — Medication 10 ML: at 04:58

## 2020-01-01 RX ADMIN — Medication 10 ML: at 03:52

## 2020-01-01 RX ADMIN — ACETAMINOPHEN 650 MG: 325 TABLET ORAL at 19:39

## 2020-01-01 RX ADMIN — DOXYCYCLINE 100 MG: 100 INJECTION, POWDER, LYOPHILIZED, FOR SOLUTION INTRAVENOUS at 09:16

## 2020-01-01 RX ADMIN — DOXYCYCLINE 100 MG: 100 INJECTION, POWDER, LYOPHILIZED, FOR SOLUTION INTRAVENOUS at 21:28

## 2020-01-01 RX ADMIN — DOXYCYCLINE 100 MG: 100 INJECTION, POWDER, LYOPHILIZED, FOR SOLUTION INTRAVENOUS at 09:22

## 2020-01-01 RX ADMIN — Medication 10 ML: at 23:17

## 2020-01-01 RX ADMIN — DOXYCYCLINE 100 MG: 100 INJECTION, POWDER, LYOPHILIZED, FOR SOLUTION INTRAVENOUS at 22:47

## 2020-01-01 RX ADMIN — POTASSIUM CHLORIDE 40 MEQ: 750 TABLET, FILM COATED, EXTENDED RELEASE ORAL at 12:01

## 2020-01-01 RX ADMIN — Medication 10 ML: at 18:43

## 2020-01-01 RX ADMIN — DOCUSATE SODIUM 50MG AND SENNOSIDES 8.6MG 1 TABLET: 8.6; 5 TABLET, FILM COATED ORAL at 21:56

## 2020-01-01 RX ADMIN — POTASSIUM CHLORIDE 40 MEQ: 750 TABLET, FILM COATED, EXTENDED RELEASE ORAL at 11:00

## 2020-01-01 RX ADMIN — Medication 10 ML: at 21:27

## 2020-01-01 RX ADMIN — PIPERACILLIN AND TAZOBACTAM 3.38 G: 3; .375 INJECTION, POWDER, LYOPHILIZED, FOR SOLUTION INTRAVENOUS at 12:52

## 2020-01-01 RX ADMIN — SODIUM CHLORIDE 1000 ML: 900 INJECTION, SOLUTION INTRAVENOUS at 19:01

## 2020-01-01 RX ADMIN — Medication 10 ML: at 02:25

## 2020-01-01 RX ADMIN — Medication 10 ML: at 17:47

## 2020-01-01 RX ADMIN — WATER 2 G: 1 INJECTION INTRAMUSCULAR; INTRAVENOUS; SUBCUTANEOUS at 11:03

## 2020-01-01 RX ADMIN — ENOXAPARIN SODIUM 40 MG: 40 INJECTION SUBCUTANEOUS at 09:42

## 2020-01-01 RX ADMIN — SODIUM CHLORIDE 1000 ML: 900 INJECTION, SOLUTION INTRAVENOUS at 19:54

## 2020-01-01 RX ADMIN — Medication 10 ML: at 05:12

## 2020-01-01 RX ADMIN — DOCUSATE SODIUM 50MG AND SENNOSIDES 8.6MG 1 TABLET: 8.6; 5 TABLET, FILM COATED ORAL at 22:47

## 2020-01-01 RX ADMIN — WATER 2 G: 1 INJECTION INTRAMUSCULAR; INTRAVENOUS; SUBCUTANEOUS at 09:22

## 2020-01-01 RX ADMIN — ENOXAPARIN SODIUM 40 MG: 40 INJECTION SUBCUTANEOUS at 10:05

## 2020-01-01 RX ADMIN — Medication 10 ML: at 21:36

## 2020-01-01 RX ADMIN — Medication 10 ML: at 05:17

## 2020-01-01 RX ADMIN — ENOXAPARIN SODIUM 40 MG: 40 INJECTION SUBCUTANEOUS at 09:17

## 2020-01-01 RX ADMIN — DOCUSATE SODIUM 50MG AND SENNOSIDES 8.6MG 1 TABLET: 8.6; 5 TABLET, FILM COATED ORAL at 21:40

## 2020-01-01 RX ADMIN — SODIUM CHLORIDE 517 ML: 900 INJECTION, SOLUTION INTRAVENOUS at 19:55

## 2020-01-01 RX ADMIN — Medication 10 ML: at 19:01

## 2020-01-01 RX ADMIN — Medication 10 ML: at 20:39

## 2020-01-01 RX ADMIN — PIPERACILLIN AND TAZOBACTAM 3.38 G: 3; .375 INJECTION, POWDER, LYOPHILIZED, FOR SOLUTION INTRAVENOUS at 23:07

## 2020-01-01 RX ADMIN — ENOXAPARIN SODIUM 40 MG: 40 INJECTION SUBCUTANEOUS at 10:31

## 2020-01-01 RX ADMIN — WATER 2 G: 1 INJECTION INTRAMUSCULAR; INTRAVENOUS; SUBCUTANEOUS at 08:40

## 2020-01-01 RX ADMIN — DOCUSATE SODIUM 50MG AND SENNOSIDES 8.6MG 1 TABLET: 8.6; 5 TABLET, FILM COATED ORAL at 22:07

## 2020-01-01 RX ADMIN — DOCUSATE SODIUM 50MG AND SENNOSIDES 8.6MG 1 TABLET: 8.6; 5 TABLET, FILM COATED ORAL at 21:36

## 2020-01-01 RX ADMIN — ACETAMINOPHEN 650 MG: 325 TABLET ORAL at 17:16

## 2020-01-01 RX ADMIN — ACETAMINOPHEN 650 MG: 325 TABLET ORAL at 18:54

## 2020-01-01 RX ADMIN — Medication 10 ML: at 13:21

## 2020-01-01 RX ADMIN — Medication 1 CAPSULE: at 11:53

## 2020-01-01 RX ADMIN — Medication 10 ML: at 06:39

## 2020-01-01 RX ADMIN — IOPAMIDOL 100 ML: 755 INJECTION, SOLUTION INTRAVENOUS at 19:54

## 2020-01-01 RX ADMIN — Medication 10 ML: at 22:07

## 2020-01-01 RX ADMIN — WATER 2 G: 1 INJECTION INTRAMUSCULAR; INTRAVENOUS; SUBCUTANEOUS at 09:09

## 2020-01-01 RX ADMIN — ACETAMINOPHEN 650 MG: 325 TABLET ORAL at 08:55

## 2020-01-01 RX ADMIN — PIPERACILLIN AND TAZOBACTAM 3.38 G: 3; .375 INJECTION, POWDER, LYOPHILIZED, FOR SOLUTION INTRAVENOUS at 21:04

## 2020-01-01 RX ADMIN — DOXYCYCLINE 100 MG: 100 INJECTION, POWDER, LYOPHILIZED, FOR SOLUTION INTRAVENOUS at 08:55

## 2020-01-01 RX ADMIN — Medication 10 ML: at 15:45

## 2020-01-01 RX ADMIN — Medication 10 ML: at 22:47

## 2020-01-01 RX ADMIN — WATER 2 G: 1 INJECTION INTRAMUSCULAR; INTRAVENOUS; SUBCUTANEOUS at 09:03

## 2020-01-01 RX ADMIN — Medication 10 ML: at 13:42

## 2020-01-01 RX ADMIN — DOXYCYCLINE 100 MG: 100 INJECTION, POWDER, LYOPHILIZED, FOR SOLUTION INTRAVENOUS at 23:16

## 2020-01-01 RX ADMIN — Medication 10 ML: at 09:57

## 2020-01-01 RX ADMIN — ENOXAPARIN SODIUM 40 MG: 40 INJECTION SUBCUTANEOUS at 09:30

## 2020-01-01 RX ADMIN — POLYETHYLENE GLYCOL 3350 17 G: 17 POWDER, FOR SOLUTION ORAL at 08:35

## 2020-01-01 RX ADMIN — LEVOFLOXACIN 750 MG: 5 INJECTION, SOLUTION INTRAVENOUS at 22:44

## 2020-01-01 RX ADMIN — Medication 10 ML: at 20:26

## 2020-01-01 RX ADMIN — ENOXAPARIN SODIUM 40 MG: 40 INJECTION SUBCUTANEOUS at 09:33

## 2020-01-01 RX ADMIN — POTASSIUM CHLORIDE 40 MEQ: 750 TABLET, FILM COATED, EXTENDED RELEASE ORAL at 16:55

## 2020-02-18 NOTE — PROGRESS NOTES
Neurology Note    Patient ID:  Atilio Posada  <[id number]>  33 y.o.  7/21/1933      Date of Consultation:  February 19, 2020    Referring Physician: Dr. Servando Pulido     Reason for Consultation:  neuropathy    Subjective: I have CMT       History of Present Illness:   Atilio Posada is a 80 y.o. male who was referred to the neurology clinic at [facility] for evaluation. From reviewing the records, the patient has a reported diagnosis of Charcot-Yasmin-Tooth and was a former patient at the [facility]. The patient presents with his daughter today who provides additional information. They report that prior to seeing the [facility], he was seen by Dr. Marley Dukes here in the [place] area. Ultimately went to the [facility] and was seen by Dr. Sumit Cao. This was in the early 2000's that he was ultimately diagnosed with Charcot-Yasmin-Tooth type II. This was diagnosed by electrodiagnostic studies. He did not have genetic testing performed. He was last seen in their clinic in 2010. They did bring copies of notes from 2008 and 2010 for my review. At that time he was already wearing AFOs to help with preventing falls. There was discussions about a motorized scooter but it does not appear that he had gotten 1. The patient states that his house has had significant modification and attempts to preventing falls. He does have a rolling walker. He also does use an office chair which he rolls around on. He does have a ramp to get in and out of the house and he does have a golf cart outside the house. He has had home safety evaluations and modifications that have been performed. His last fall was a few weeks ago. His falls are irregular and there is no one consistent theme that causes a fall. He can function independently in the bathroom and in the kitchen. He only uses the microwave to heat up food.   His daughter who lives nearby also provides some of the meals for him. He does have people who come by and help from a cleaning standpoint. It does seem like he is a very active individual and stays busy daily. The other thing from his medical history in regards to his neurological function that they wanted to mention is he did have cervical spine surgery performed in 2003. This was performed at [facility] as well. He denies any new numbness, tingling, or weakness. He has a persistent weakness in his legs which he feels has been relatively consistent. He does wear his AFOs and has not noticed any significant amount of skin breakdown. He does notice occasional erythema but this does resolve. Past medical history:    High cholesterol. Past Surgical History:   Procedure Laterality Date    HX ORTHOPAEDIC      NEUROLOGICAL PROCEDURE UNLISTED      prior c spine surgery    Family History   Problem Relation Age of Onset    Heart Disease Mother     Heart Disease Father     Parkinsonism Paternal Uncle         Social History     Tobacco Use    Smoking status: Never Smoker    Smokeless tobacco: Never Used   Substance Use Topics    Alcohol use: Not Currently        No Known Allergies     Prior to Admission medications    Medication Sig Start Date End Date Taking? Authorizing Provider   cholecalciferol, vitamin D3, (VITAMIN D3) 50 mcg (2,000 unit) tab Take  by mouth. Yes Provider, Historical   aspirin delayed-release (ECOTRIN LOW STRENGTH) 81 mg tablet Take  by mouth daily. Yes Provider, Historical   folic acid/multivit-min/lutein (CENTRUM SILVER PO) Take  by mouth. Yes Provider, Historical   lovastatin (MEVACOR) 20 mg tablet Take 20 mg by mouth nightly. Yes Provider, Historical   docusate sodium (DOK) 250 mg capsule Take 250 mg by mouth daily. Yes Provider, Historical       Review of Systems:    General, constitutional: falls, fatigue,   Eyes, vision: negative  Ears, nose, throat: hearing loss.    Cardiovascular, heart: negative  Respiratory: negative  Gastrointestinal: constipation  Genitourinary: negative  Musculoskeletal: negative  Skin and integumentary: negative  Psychiatric: negative  Endocrine: negative  Neurological: negative, except for HPI  Hematologic/lymphatic: negative  Allergy/immunology: negative        Objective:     Visit Vitals  /78   Pulse 70   Ht 5' 11\" (1.803 m)   SpO2 97%       Physical Exam:      General:  appears well nourished in no acute distress  Neck: no carotid bruits  Lungs: clear to auscultation  Heart:  no murmurs, regular rate  Lower extremity: peripheral pulses palpable and no edema  Skin: intact. There is no erythema today    Neurological exam:    Awake, alert, oriented to person, place and time  Recent and remote memory were normal  Attention and concentration were intact  Language was intact. There was no aphasia  Speech: no dysarthria  Fund of knowledge was preserved    Cranial nerves:   II-XII were tested    Perrrla  Fundoscopic examination revealed venous pulsations and no clear abnormalities  Visual fields were full  Eomi, no evidence of nystagmus  Facial sensation:  normal and symmetric  Facial motor: normal and symmetric  Hearing intact  SCM strength intact  Tongue: midline without fasciculations    Motor: Tone  - decreased in lower extremities    No evidence of fasciculations    Strength testing:   deltoid triceps biceps Wrist ext. Wrist flex. intrinsics Hip flex. Hip ext. Knee ext. Knee flex Dorsi flex Plantar flex   Right 5 5 5 5 5 4 5 5 5 5 1 1   Left 5 5 5 5 5 4 5 5 5 5 1 1     Ulnar innervated muscles 4/5    Sensory:  Upper extremity: sensory loss in distal ulnar distribution  Lower extremity: vibration was absent in toes, 2 seconds at ankles. 5 seconds at knees    Reflexes:    Right Left  Biceps  1 1  Triceps 1 1  Brachiorad.  1 1  Patella  -           -  Achilles            - -    Plantar response:  flexor bilaterally      Cerebellar testing:  no tremor apparent, finger/nose and hamilton were intact    Romberg: not tested due to concern of safety    Gait:not assessed today. Due to concern over safety    Labs: There are no laboratory results or imaging available for my review. They did provide 4 pages of medical records for my review in regards to his prior history of CMT. Assessment and Plan:   The patient is a pleasant 59-year-old gentleman with multiple medical problems who was referred for his history of Charcot-Yasmin-Tooth disease. Peripheral neuropathy:    We discussed at length what is known and what is not known about Charcot-Yasmin-Tooth. We talked about the hereditary and genetic nature of the disease. We talked about how there is a lot more we know in regards to the genetics of the disease and was known in the past.  They are not interested in genetic testing at this time. We did talk at length about the importance of safety with ambulation and preventing falls. He should continue to wear his AFOs. He will continue to use his rolling walker and rolling chair within the house and his golf cart outside of the house. I do think he would benefit from a motorized scooter and they are looking into one. I did tell him if he had any paperwork that need to be complete her prescriptions that were required please let me know after they do decide. Neuropathy:  we reviewed the causes contributing to the neuropathy. We discussed the importance of exercise and activity. I also reviewed the importance of safety with ambulation and ways to prevents falls. Superimposed ulnar neuropathy:  From a clinical examination standpoint this was notable. I do think this is due to his lifestyle and body positioning. We talked at length about not resting on his elbows. I will hold off on performing electrodiagnostic testing at this time. This may be something that is necessary in the future if things continue to worsen.   Given his underlying CMT he is slightly more prone to developing entrapment neuropathies. We talked about the importance of his hand functioning for all that he does and his independence. He will work on modifications to lifestyle. We also talked about potentially elbow pads or additional cushions or padding to wear his elbow is resting. He and his daughter will work on this. The patient should return to clinic in 1 year  Renewed medication: none today    I spent  65   minutes with the patient  with over 50 % of the time counseling and coordinating the care plan in regards to the diagnosis, diagnostic testing, and treatment plan. The patient had the ability to ask questions and all questions were answered.            Signed By:  Reinaldo Siddiqui DO FAAN    February 19, 2020

## 2020-08-27 PROBLEM — J18.9 BILATERAL PNEUMONIA: Status: ACTIVE | Noted: 2020-01-01

## 2020-08-27 NOTE — ED PROVIDER NOTES
81 y/o M with PMH of HLD, asbestosis, and CMT type 2 presents for fall and back pain. Pt accompanied by daughter. Per EMS pt tachy to 120 on the ride over, BP and O2 stable. Pt and daughter are poor historians. Pt complains of low back pain and pain radiating down both legs when he tries to stand. Complains of fever and chills 4 days ago. Taking tylenol and motrin PRN for pain, last dose yesterday. Only reported home medications are ASA 81mg daily and Lovastatin. Pt then complains of red/yellow urine for the past 2-3 weeks, daughter says was admitted for UTI to another hospital before, doesn't remember when. Pt says he fell 4 days ago in his kitchen trying to do dishes, hurt his back and hit back of head with bleeding, \"A lot of blood on the ground\", then feel again the next day in bedroom onto table. Pt with trouble walking at baseline due to CMT type 2 and requires leg braces, but works outside on his 3 acrFurious the time\" and doesn't drink enough liquids. Denies any history of blood clots, blood thinners, or stroke. Denies any CP, dysuria, nausea, vomiting, diarrhea, constipation, or blood in stool. Complains of dizziness and chronic cough/SOB. Pt seen and examined with Dr. Sharyn Osgood. Chart reviewed. No past medical history on file. Past Surgical History:  
Procedure Laterality Date  HX ORTHOPAEDIC    
 NEUROLOGICAL PROCEDURE UNLISTED Family History:  
Problem Relation Age of Onset  Heart Disease Mother  Heart Disease Father  Parkinsonism Paternal Uncle Social History Socioeconomic History  Marital status:  Spouse name: Not on file  Number of children: Not on file  Years of education: Not on file  Highest education level: Not on file Occupational History  Not on file Social Needs  Financial resource strain: Not on file  Food insecurity Worry: Not on file Inability: Not on file  Transportation needs Medical: Not on file Non-medical: Not on file Tobacco Use  Smoking status: Never Smoker  Smokeless tobacco: Never Used Substance and Sexual Activity  Alcohol use: Not Currently  Drug use: Not on file  Sexual activity: Not on file Lifestyle  Physical activity Days per week: Not on file Minutes per session: Not on file  Stress: Not on file Relationships  Social connections Talks on phone: Not on file Gets together: Not on file Attends Church service: Not on file Active member of club or organization: Not on file Attends meetings of clubs or organizations: Not on file Relationship status: Not on file  Intimate partner violence Fear of current or ex partner: Not on file Emotionally abused: Not on file Physically abused: Not on file Forced sexual activity: Not on file Other Topics Concern  Not on file Social History Narrative  Not on file ALLERGIES: Patient has no known allergies. Review of Systems Constitutional: Positive for chills, fatigue and fever. HENT: Negative for congestion and trouble swallowing. Eyes: Negative for pain. Respiratory: Positive for cough. Negative for wheezing. Cardiovascular: Negative for chest pain and palpitations. Gastrointestinal: Positive for abdominal pain. Negative for blood in stool, constipation, diarrhea, nausea and vomiting. Genitourinary: Positive for hematuria (\"red/orange urine for 2-3 weeks\"). Negative for difficulty urinating, dysuria and frequency. Musculoskeletal: Positive for back pain. Skin:  
     Abrasion on low back, back of head, and bilateral knees Neurological: Positive for light-headedness. Negative for syncope and numbness. Psychiatric/Behavioral: Negative for confusion. The patient is not nervous/anxious. Vitals:  
 08/27/20 1759 BP: 131/55 Pulse: (!) 116 Resp: 20 Temp: (!) 101.5 °F (38.6 °C) SpO2: 95% Weight: 83.9 kg (185 lb) Height: 5' 11\" (1.803 m) Physical Exam 
Vitals signs reviewed. Constitutional:   
   Appearance: He is ill-appearing. HENT:  
   Head:  
   Comments: Small abrasion on back of head, no swelling or bleeding Right Ear: External ear normal.  
   Left Ear: External ear normal.  
   Nose: Nose normal.  
   Mouth/Throat:  
   Mouth: Mucous membranes are dry. Eyes:  
   Conjunctiva/sclera: Conjunctivae normal.  
   Pupils: Pupils are equal, round, and reactive to light. Neck: Musculoskeletal: Neck supple. No muscular tenderness. Cardiovascular:  
   Rate and Rhythm: Regular rhythm. Tachycardia present. Pulses: Normal pulses. Heart sounds: No murmur. Pulmonary:  
   Effort: Tachypnea present. Breath sounds: No wheezing or rhonchi. Abdominal:  
   General: Bowel sounds are normal. There is distension. Tenderness: There is abdominal tenderness (generalized, worse in RLQ). There is no rebound. Hernia: A hernia (umbilical) is present. Musculoskeletal:  
   Right hip: He exhibits no tenderness (no tenderness with hip flexion or internal or external rotation). Left hip: He exhibits no tenderness (no tenderness with hip flexion or internal or external rotation). Right knee: He exhibits no swelling. No tenderness found. Left knee: He exhibits no swelling. No tenderness found. Cervical back: He exhibits no bony tenderness. Thoracic back: He exhibits no bony tenderness. Lumbar back: He exhibits no bony tenderness. Right lower leg: No edema. Left lower leg: No edema. Skin: 
   General: Skin is warm. Capillary Refill: Capillary refill takes less than 2 seconds. Coloration: Skin is not jaundiced. Findings: Lesion (3-4 cm abrasion on R low back) present. Neurological:  
   Mental Status: He is alert and oriented to person, place, and time. Comments: Slight L facial droop and left sided weakness in upper and lower extremities Psychiatric:     
   Mood and Affect: Mood is not anxious or depressed. MDM Number of Diagnoses or Management Options Abdominal pain, right lower quadrant:  
Acute cystitis with hematuria:  
Community acquired bilateral lower lobe pneumonia:  
Fall, initial encounter:  
Fever, unspecified fever cause:  
Left-sided weakness:  
Lesion of lumbar spine:  
Liver mass:  
Right renal mass:  
Sepsis, due to unspecified organism, unspecified whether acute organ dysfunction present [facility]):  
Diagnosis management comments: 81 y/o M with 2 falls 3 and 4 days ago with sepsis (fever, tachycardia) due to unknown source. EKG with sinus tachy. CXR showed Left-sided pleural effusion with associated passive atelectasis and/or consolidation. CT head showed no acute abnormality. CT Chest/ABD/Pelv showed 1. Inflammation and gas in the left retroperitoneum, possible nonspecific infection. No drainable abscess. 2. Bilateral lower lobe pneumonia more likely than atelectasis, greatest in the 
left lower lobe. 3. 4.3 cm enhancing mass in segment 3 of the liver cannot be fully characterized on single phase study. 4. 1.5 cm right renal mass versus hemorrhagic cyst. 5. Lytic lesions in the lumbar spine represent metastatic disease versus hemangioma. Pt treated with 30cc/kg fluid bolus, Zosyn, and Levaquin. Will consult hospitalist for admission. ED EKG interpretation: 
Rhythm: sinus tachycardia; and regular . Rate (approx.): 114; Axis: left axis deviation; Inferior infarct, age undetermined. Abnormal EKG. EKG documented by Sandhya Awan MD, reviewed by Dr. Megan Epperson. Amount and/or Complexity of Data Reviewed Clinical lab tests: reviewed Tests in the radiology section of CPT®: reviewed ED Course as of Aug 27 1825 Thu Aug 27, 2020  
1748 Pt brought to ED via EMS for back pain.  EMS reports pt with fall 4 days ago with injury to back and back of head, back pain radiating to lower legs. EMS states pt tachy from 110-120s en route with stable blood pressure and O2 saturation. Pt also complains of red urine. EMS talked to pt's daughter who says pt takes lovastatin, was not told if patient takes any ASA or blood thinner.   
 [SM] ED Course User Index [SM] Merary Lopez MD  
 
 
Procedures: none. Linda Flores MD 
08/27/20

## 2020-08-28 PROBLEM — N28.89 RENAL MASS, RIGHT: Status: ACTIVE | Noted: 2020-01-01

## 2020-08-28 PROBLEM — E87.6 HYPOKALEMIA: Status: ACTIVE | Noted: 2020-01-01

## 2020-08-28 PROBLEM — R16.0 LIVER MASS: Status: ACTIVE | Noted: 2020-01-01

## 2020-08-28 PROBLEM — E78.5 DYSLIPIDEMIA: Status: ACTIVE | Noted: 2020-01-01

## 2020-08-28 PROBLEM — G60.0 CHARCOT-MARIE-TOOTH DISEASE TYPE 2: Status: ACTIVE | Noted: 2020-01-01

## 2020-08-28 PROBLEM — N39.0 UTI (URINARY TRACT INFECTION): Status: ACTIVE | Noted: 2020-01-01

## 2020-08-28 NOTE — ED NOTES
Verbal/Bedside report was given to [facility] [address]  who will assume care of patient at this time. SBAR report consisted of ED summary, MAR, recent results, and additional pertinent information. Receiving nurse given opportunity to ask questions and seek clarifications. Receiving nurse aware of pending lab results and orders that are to be completed.

## 2020-08-28 NOTE — PROGRESS NOTES
Attempted to reach patient and patient's daughter to complete med rec but was unable to reach either. Will try again later.

## 2020-08-28 NOTE — H&P
[address] 
[phone] Admission History and Physical 
 
 
NAME:       Yumiko Vera :       1933 MRN:      [id number] PCP:      Zhao Gonzalez MD  
 
Date of service:   2020 Chief  Complaint:  Low back pain, fall History Of Presenting Illness:    
 
Mr. Natalia Duran is a 80 y.o. male who is being admitted for Bilateral pneumonia. Mr. Natalia Duran presented to our Emergency Department today complaining of having fallen and with low back pain. He is a poor historian and no family member available. Discussed with the ED physician who had discussed with the daughter. The patient had apparently fallen four days prior to admission in the ED and had some bleeding. He has underlying  Charcot-Yasmin-Tooth disease type 2 which has in the recent past worsened. CT scan done today was unremarkable for any new acute changes. He was noted to be hypoxic and a CT studies done showed bilateral pneumonia, a liver mass, possible right renal lesions as well as lytic lesions in the lumbar spine concerning for metastatic disease. He will be admitted for further management. No Known Allergies Prior to Admission medications Medication Sig Start Date End Date Taking? Authorizing Provider  
cholecalciferol, vitamin D3, (VITAMIN D3) 50 mcg (2,000 unit) tab Take  by mouth. Provider, Historical  
aspirin delayed-release (ECOTRIN LOW STRENGTH) 81 mg tablet Take  by mouth daily. Provider, Historical  
folic acid/multivit-min/lutein (CENTRUM SILVER PO) Take  by mouth. Provider, Historical  
lovastatin (MEVACOR) 20 mg tablet Take 20 mg by mouth nightly. Provider, Historical  
docusate sodium (DOK) 250 mg capsule Take 250 mg by mouth daily. Provider, Historical  
 
 
No past medical history on file. Past Surgical History:  
Procedure Laterality Date  HX ORTHOPAEDIC    
 NEUROLOGICAL PROCEDURE UNLISTED Social History Tobacco Use  Smoking status: Never Smoker  Smokeless tobacco: Never Used Substance Use Topics  Alcohol use: Not Currently Family History Problem Relation Age of Onset  Heart Disease Mother  Heart Disease Father  Parkinsonism Paternal Uncle Review of Systems: not much obtainable from the patient Examination: 
 
Constitutional:   
Visit Vitals BP (!) 118/100 Pulse (!) 104 Temp 98.8 °F (37.1 °C) Resp 24 Ht 5' 11\" (1.803 m) Wt 83.9 kg (185 lb) SpO2 95% BMI 25.80 kg/m² General:  Weak and ill looking patient in no acute distress Eyes: Pink conjunctivae, PERRLA with no discharge. Normal eye movements Ear, Nose, Mouth & Throat: No ottorrhea, rhinorrhea, non tender sinuses, dry mucous membranes Respiratory: + accessory muscle use, decreased breath sounds with some crackles over bases. No wheezes Cardiovascular:  No JVD or murmurs, regular and normal S1, S2 without thrills, bruits. + peripheral edema. GI & :  Soft abdomen, non-tender, non-distended, normoactive bowel sounds with no palpable organomegaly Heme:  No cervical or axillary adenopathy. Musculoskeletal:  No cyanosis, clubbing, atrophy or deformities Skin:  No rashes, bruising or ulcers Neurological: Awake and alert, speech is clear, CNs 2-12 are grossly intact and otherwise non focal 
Psychiatric:  Has a good insight and is oriented x 3 
________________________________________________________________________ Data Review: 
 
Labs: 
 
Recent Labs  
  08/27/20 1859 WBC 17.1* HGB 15.3 HCT 44.5 * Recent Labs  
  08/27/20 1859 * K 3.5  CO2 20* * BUN 44* CREA 1.16  
CA 8.9 ALB 2.6* ALT 38 No components found for: [place] No results for input(s): PH, PCO2, PO2, HCO3, FIO2 in the last 72 hours. No results for input(s): INR, INREXT, INREXT in the last 72 hours. Imaging Studies: All imaging studies - reviewed Personally reviewed 12 lead EKG - sinus tachycardia, motion artifacts I have also reviewed available old medical records. Assessment & Impression: 
  
Mr. Tim Bailey is a 80 y.o. male being evaluated for:  
 
Principal Problem: 
  Bilateral pneumonia (8/27/2020) Active Problems: 
  Liver mass (8/28/2020) Renal mass, right (8/28/2020) Dyslipidemia (8/28/2020) Hypokalemia (8/28/2020) UTI (urinary tract infection) (8/28/2020) Charcot-Yasmin-Tooth disease type 2 (8/28/2020) Plan of management: 
 
Bilateral pneumonia (8/27/2020) / Sepsis POA: admit to hospital. Check for SARS-CoV 2, pneumonia serologies. Start empiric IV Zosyn and Doxycycline. Consult Pulmonology Liver mass (8/28/2020) / Renal mass, right (8/28/2020) POA: he also has lytic lesions in the lumbar spine concerning for metastasis. Unclear primary. Will need MRi studies once COVID excluded. Consult Oncology Hypokalemia (8/28/2020) / Hyponatremia POA: mild. Hydrate and monitor clinically ? UTI (urinary tract infection) (8/28/2020) POA: apparently recently treated for a UTi. Continue empiric IV Zosyn Charcot-Yasmin-Tooth disease type 2 (8/28/2020) POA: follows up at [facility] and uses bilateral ankle foot orthotics. Supportive care for now Dyslipidemia (8/28/2020) POA: verify home medications and resume Code Status:  Full Surrogate decision maker: Family Risk of deterioration: high Total time spent for the care of the patient: 79 Minutes Care Plan discussed with: Patient, Nursing Staff and ED physician Discussed:  Code Status, Care Plan and D/C Planning Prophylaxis:  SCD's 
 
Probable Disposition:  SNF/LTC  
        
___________________________________________________ Attending Physician: Lis Law MD

## 2020-08-28 NOTE — ED NOTES
Notified provider of drop in platelet count to 99. Inquired about sepsis protocol. No new orders at this time.

## 2020-08-28 NOTE — PROGRESS NOTES
Physician Progress Note Kevin Phelps 
CSN #:                  [id number] :                       1933 ADMIT DATE:       2020 5:34 PM 
[address] [place] DATE: 
RESPONDING 
PROVIDER #:        Tahmina Parra MD 
 
 
 
 
QUERY TEXT: 
 
20 @ 35 67 15 Dear Hospitalist 
 
Patient admitted with Sepsis 2/2 PNA, noted to have tachypnea and hypoxia. If possible, please document in the progress notes and discharge summary if you are evaluating and/or treating any of the following: The medical record reflects the following: 
Risk Factors: 79 yo m admitted with sepsis 2/2 PNA Clinical Indicators: RR 31-33 O2 sats 84-88% while on O2 @ 1.5L via NC 
ED states \" Tachypnea present\"  H&P states \"He was noted to be hypoxic\" \"+ accessory muscle use\" Treatment: O2 @ 3L Via NC,  IV zosyn and doxycycline Thank you for your time [facility] RN/BSN, [address] [place] [address]   Desk:   [phone] Options provided: 
-- Acute respiratory failure with hypoxia 
-- Acute respiratory failure with hypercapnia 
-- Chronic respiratory failure with hypoxia -- Chronic respiratory failure with hypercapnia 
-- Acute on chronic respiratory failure with hypoxia 
-- Acute on chronic respiratory failure with hypercapnia 
-- Other - I will add my own diagnosis -- Disagree - Not applicable / Not valid -- Disagree - Clinically unable to determine / Unknown 
-- Refer to Clinical Documentation Reviewer PROVIDER RESPONSE TEXT: 
 
This patient is in acute respiratory failure with hypoxia.  
 
Query created by: Abhinav Mejia on 2020 12:39 PM 
 
 
Electronically signed by:  Tahmina Parra MD 2020 3:57 PM

## 2020-08-28 NOTE — PROGRESS NOTES
[facility] Adult  Hospitalist Group Hospitalist Progress Note Nereida Jung MD 
  
  
Date of Service:  2020 NAME:  Arabella Sam :  1933 MRN:  [id number] Admission Summary:  
80-year-old male presented to the hospital after a fall complaining of back pain. He was noted to be hypoxic and a CT scan showed bilateral pneumonia, liver mass, right renal lesions and lytic lesions in the lumbar spine concerning for metastatic disease Interval history / Subjective:  
 pt has no complaints currently Assessment & Plan:  
 
Bilateral pneumonia 
-COVID negative x1, will check again 
-Follow-up sputum cultures, mycoplasma, Legionella, S pneumo, respiratory viral panel 
-Continue empiric Zosyn and doxycycline 
-Pulmonary consulted UTI 
-Follow cultures, positive for gram-negative rods in both bottles 
-Antibiotics as above Sepsis 
-Secondary to above 
-Blood cultures positive Liver mass/renal mass/lytic bone lesions 
-Unclear primary 
-Oncology consulted, recommends MRI and biopsy on Monday Hypokalemia 
-Likely due to intravascular volume depletion 
-Replete and monitor Charcot Yasmin tooth disease type II 
-Follows at [facility] 
-Continue supportive care and foot orthotics Code status: Full DVT prophylaxis: [facility] Problems  Date Reviewed: 2020 Codes Class Noted POA Liver mass ICD-10-CM: R16.0 ICD-9-CM: 573.8  2020 Yes Renal mass, right ICD-10-CM: N28.89 ICD-9-CM: 593.9  2020 Yes Dyslipidemia ICD-10-CM: E78.5 ICD-9-CM: 272.4  2020 Yes Hypokalemia ICD-10-CM: E87.6 ICD-9-CM: 276.8  2020 Yes  
   
 UTI (urinary tract infection) ICD-10-CM: N39.0 ICD-9-CM: 599.0  2020 Yes Charcot-Yasmin-Tooth disease type 2 ICD-10-CM: G60.0 ICD-9-CM: 356.1  2020 Yes * (Principal) Bilateral pneumonia ICD-10-CM: J18.9 ICD-9-CM: 960  8/27/2020 Yes Review of Systems: A comprehensive review of systems was negative except for that written in the HPI. Vital Signs:  
 Last 24hrs VS reviewed since prior progress note. Most recent are: 
Visit Vitals /72 Pulse 91 Temp 98.5 °F (36.9 °C) Resp 17 Ht 5' 11\" (1.803 m) Wt 83.9 kg (185 lb) SpO2 97% BMI 25.80 kg/m² Intake/Output Summary (Last 24 hours) at 8/28/2020 1620 Last data filed at 8/27/2020 2105 Gross per 24 hour Intake 2517 ml Output  Net 2517 ml Physical Examination:  
 
 
     
Constitutional:  No acute distress, cooperative, pleasant ENT:  Oral mucosa moist, oropharynx benign. Resp:  CTA bilaterally. No wheezing/rhonchi/rales. No accessory muscle use CV:  Regular rhythm, normal rate, no murmurs, gallops, rubs GI:  Soft, non distended, non tender. normoactive bowel sounds, no hepatosplenomegaly Musculoskeletal:  No edema, warm, 2+ pulses throughout Neurologic:  Moves all extremities. AAOx3, CN II-XII reviewed Psych:  Good insight, Not anxious nor agitated. Data Review:  
 Review and/or order of clinical lab test 
 
 
Labs:  
 
Recent Labs  
  08/28/20 0413 08/27/20 1859 WBC 16.5* 17.1* HGB 13.7 15.3 HCT 40.8 44.5 PLT 99* 112* Recent Labs  
  08/28/20 0413 08/27/20 1859  134* K 3.5 3.5  103 CO2 20* 20* BUN 37* 44* CREA 0.86 1.16  
* 139* CA 8.2* 8.9 Recent Labs  
  08/28/20 0413 08/27/20 1859 ALT 37 38  133* TBILI 1.5* 1.9* TP 6.7 7.7 ALB 2.3* 2.6*  
GLOB 4.4* 5.1* LPSE  --  22* No results for input(s): INR, PTP, APTT, INREXT in the last 72 hours. No results for input(s): FE, TIBC, PSAT, FERR in the last 72 hours. No results found for: FOL, RBCF No results for input(s): PH, PCO2, PO2 in the last 72 hours. Recent Labs  
  08/27/20 
1859 08/27/20 
1906 CPK  --  73  
TROIQ <0.05  -- No results found for: CHOL, CHOLX, CHLST, CHOLV, HDL, HDLP, LDL, LDLC, DLDLP, TGLX, TRIGL, TRIGP, CHHD, CHHDX No results found for: Amanda Torres Lab Results Component Value Date/Time Color DARK YELLOW 08/27/2020 08:00 PM  
 Appearance CLOUDY (A) 08/27/2020 08:00 PM  
 Specific gravity 1.022 08/27/2020 08:00 PM  
 pH (UA) 5.5 08/27/2020 08:00 PM  
 Protein 100 (A) 08/27/2020 08:00 PM  
 Glucose Negative 08/27/2020 08:00 PM  
 Ketone 80 (A) 08/27/2020 08:00 PM  
 Bilirubin Negative 08/27/2020 08:00 PM  
 Urobilinogen 1.0 08/27/2020 08:00 PM  
 Nitrites Positive (A) 08/27/2020 08:00 PM  
 Leukocyte Esterase SMALL (A) 08/27/2020 08:00 PM  
 Epithelial cells FEW 08/27/2020 08:00 PM  
 Bacteria 2+ (A) 08/27/2020 08:00 PM  
 WBC 5-10 08/27/2020 08:00 PM  
 RBC 5-10 08/27/2020 08:00 PM  
 
 
 
Medications Reviewed:  
 
Current Facility-Administered Medications Medication Dose Route Frequency  sodium chloride (NS) flush 5-40 mL  5-40 mL IntraVENous Q8H  
 sodium chloride (NS) flush 5-40 mL  5-40 mL IntraVENous PRN  
 acetaminophen (TYLENOL) tablet 650 mg  650 mg Oral Q6H PRN Or  
 acetaminophen (TYLENOL) suppository 650 mg  650 mg Rectal Q6H PRN  polyethylene glycol (MIRALAX) packet 17 g  17 g Oral DAILY PRN  promethazine (PHENERGAN) tablet 12.5 mg  12.5 mg Oral Q6H PRN Or  
 ondansetron (ZOFRAN) injection 4 mg  4 mg IntraVENous Q6H PRN  
 doxycycline (VIBRAMYCIN) 100 mg in 0.9% sodium chloride (MBP/ADV) 100 mL  100 mg IntraVENous Q12H  
 sodium chloride (NS) flush 5-10 mL  5-10 mL IntraVENous PRN  piperacillin-tazobactam (ZOSYN) 3.375 g in 0.9% sodium chloride (MBP/ADV) 100 mL  3.375 g IntraVENous Q8H Current Outpatient Medications Medication Sig  
 aspirin delayed-release (ECOTRIN LOW STRENGTH) 81 mg tablet Take 81 mg by mouth daily.  lovastatin (MEVACOR) 20 mg tablet Take 20 mg by mouth nightly. ______________________________________________________________________ EXPECTED LENGTH OF STAY: 4d 19h ACTUAL LENGTH OF STAY:          1 Tripp Casas MD

## 2020-08-28 NOTE — CONSULTS
[facility] [place] at [address], [address] W: [phone]  F: [phone] Reason for Visit:  
Indra Peterson is a 80 y.o. male who isseen by synchronous (real-time) audio-video technology on 8/27/2020 in consultation at the request of Dr. Zev Beavers for evaluation of Liver mass, right renal mass, lytic lumbar spine concerning for metastatic disease. History of Present Illness:  
Mr. Indra Peterson is an 81 y/o male admitted 8/28/2020 with c/o fall, lower back pain, found to have liver mass, bone lesions. In ED, daughter reports pt fell 4 days ago. Pt is a poor historian. Hx of Charcot-Yasmin-Tooth disease. ED labs notable for WBC 17.1 , , T-bili 1.9. CT scan shows bilateral PNA; liver mass, possible R renal lesion and lytic lesions in the lumbar spine concerning for metastatic disease. He was admitted for further eval and management. No past medical history on file. Past Surgical History:  
Procedure Laterality Date  HX ORTHOPAEDIC    
 NEUROLOGICAL PROCEDURE UNLISTED Social History Tobacco Use  Smoking status: Never Smoker  Smokeless tobacco: Never Used Substance Use Topics  Alcohol use: Not Currently Family History Problem Relation Age of Onset  Heart Disease Mother  Heart Disease Father  Parkinsonism Paternal Uncle Current Facility-Administered Medications Medication Dose Route Frequency  sodium chloride (NS) flush 5-40 mL  5-40 mL IntraVENous Q8H  
 sodium chloride (NS) flush 5-40 mL  5-40 mL IntraVENous PRN  
 acetaminophen (TYLENOL) tablet 650 mg  650 mg Oral Q6H PRN Or  
 acetaminophen (TYLENOL) suppository 650 mg  650 mg Rectal Q6H PRN  polyethylene glycol (MIRALAX) packet 17 g  17 g Oral DAILY PRN  promethazine (PHENERGAN) tablet 12.5 mg  12.5 mg Oral Q6H PRN  Or  
 ondansetron (ZOFRAN) injection 4 mg  4 mg IntraVENous Q6H PRN  
  doxycycline (VIBRAMYCIN) 100 mg in 0.9% sodium chloride (MBP/ADV) 100 mL  100 mg IntraVENous Q12H  
 sodium chloride (NS) flush 5-10 mL  5-10 mL IntraVENous PRN  piperacillin-tazobactam (ZOSYN) 3.375 g in 0.9% sodium chloride (MBP/ADV) 100 mL  3.375 g IntraVENous Q8H Current Outpatient Medications Medication Sig  
 aspirin delayed-release (ECOTRIN LOW STRENGTH) 81 mg tablet Take 81 mg by mouth daily.  lovastatin (MEVACOR) 20 mg tablet Take 20 mg by mouth nightly. No Known Allergies Review of Systems: A complete review of systems was obtained, negative except as described above. Physical Exam:  
 
Visit Vitals /74 Pulse 88 Temp 98.8 °F (37.1 °C) Resp 20 Ht 5' 11\" (1.803 m) Wt 83.9 kg (185 lb) SpO2 97% BMI 25.80 kg/m² Due to this being a TeleHealth evaluation, many elements of the physical examination are unable to be assessed. Constitutional: Appears well-developed and well-nourished in no apparent distress Mental status: Alert and awake, Oriented to person/place/time, Able to follow commands Eyes: EOM normal, Sclera normal, No visible ocular discharge HENT: Normocephalic, atraumatic; Mouth/Throat: Moist mucous membranes, External Ears normal 
Neck: No visualized mass Pulmonary/Chest: Respiratory effort normal, No visualized signs of difficulty breathing or respiratory distress Musculoskeletal: Normal gait with no signs of ataxia, Normal range of motion of neck Neurological: No facial asymmetry (Cranial nerve 7 motor function), No gaze palsy Skin: No significant exanthematous lesions or discoloration noted on facial skin Psychiatric: Normal affect, normal judgment/insight. No hallucinations Results:  
 
Lab Results Component Value Date/Time  WBC 16.5 (H) 08/28/2020 04:13 AM  
 HGB 13.7 08/28/2020 04:13 AM  
 HCT 40.8 08/28/2020 04:13 AM  
 PLATELET 99 (L) 53/14/5613 04:13 AM  
 MCV 92.9 08/28/2020 04:13 AM  
 ABS. NEUTROPHILS 13.5 (H) 08/28/2020 04:13 AM  
 
Lab Results Component Value Date/Time Sodium 138 08/28/2020 04:13 AM  
 Potassium 3.5 08/28/2020 04:13 AM  
 Chloride 108 08/28/2020 04:13 AM  
 CO2 20 (L) 08/28/2020 04:13 AM  
 Glucose 133 (H) 08/28/2020 04:13 AM  
 BUN 37 (H) 08/28/2020 04:13 AM  
 Creatinine 0.86 08/28/2020 04:13 AM  
 GFR est AA >60 08/28/2020 04:13 AM  
 GFR est non-AA >60 08/28/2020 04:13 AM  
 Calcium 8.2 (L) 08/28/2020 04:13 AM  
 
Lab Results Component Value Date/Time Bilirubin, total 1.5 (H) 08/28/2020 04:13 AM  
 ALT (SGPT) 37 08/28/2020 04:13 AM  
 Alk. phosphatase 117 08/28/2020 04:13 AM  
 Protein, total 6.7 08/28/2020 04:13 AM  
 Albumin 2.3 (L) 08/28/2020 04:13 AM  
 Globulin 4.4 (H) 08/28/2020 04:13 AM  
 
 
Lab Results Component Value Date/Time Lipase 22 (L) 08/27/2020 06:59 PM  
 
No results found for: INR, APTT, DDIMSQ, DDIME, 573832, Alaina Darryl, FDPLT, Lindbergh Sovereign, PRSFLT, [address], [address], [id number], [id number], [id number], 482792, 263845, 726970, Thelda Forth No results found for: CEA, 205524, C199LT, C125, IHNE373, 2729LT, C153LT, PSA, PSALT, LDH, [id number], HCGTLT, HCGN, AFP, CHRGLT 
 
8/27/2020 XR CHEST IMPRESSION: 
1. Left-sided pleural effusion with associated passive atelectasis and/or 
consolidation. 8/27/2020 CT HEAD WO CONT IMPRESSION:  
 No acute intracranial abnormality on this noncontrast head CT. 
 
8/27/2020 CT CHEST ABD PELV W CONT IMPRESSION: 
  
1. Inflammation and gas in the left retroperitoneum around the left external 
iliac vasculature represent sequela of recent instrumentation versus nonspecific 
infection. No drainable abscess. 2. Bilateral lower lobe pneumonia more likely than atelectasis, greatest in the 
left lower lobe. 3. 4.3 cm enhancing mass in segment 3 of the liver cannot be fully characterized 
on this single phase study.  
4. 1.5 cm right renal mass versus hemorrhagic cyst. 
 5. Lytic lesions in the lumbar spine represent metastatic disease versus 
hemangioma. Recommendation: MRI abdomen as an outpatient to evaluate the liver and right 
renal findings. Bone scan as an outpatient. 
  
Assessment and Recommendations:  
81 yo male with unavailable PMHx admitted with lower back pain. 1. Liver mass/Renal mass/ lytic lesions Concerning for metastases, possible metastatic RCC. Agree with obtaining abdomen MRI to further characterize liver and kidney lesions -- MRI abdomen ordered 
-- Recommend biopsy of liver mass on Monday. -- Called patient's daughter to discuss plan, but no answer. Will follow up with her on Monday. 2. Bilateral PNA:  
SARS-CoV-2 negative. Pulmonary evaluation. Pt on IV antibiotics, Doxy and zosyn 3. Fever:  
Likely 2/2 pneumonia. Blood culture pending. Urine culture pending 4. Elevated LFTs/ T bili Likely 2/2 to liver mass 5. Qotpcoi-Ypcwp-Yjjdyy disease Follows at [facility]; has bilateral ankle foot orthotics Continue supportive care 6. Falls/Debility PT/OT jose RODRIGUEZ was in the office while conducting this encounter. The patient was at his at home Consent: 
He and/or his healthcare decision maker is aware that this patient-initiated Telehealth encounter is a billable service, with coverage as determined by his insurance carrier. He is aware that he may receive a bill and has provided verbal consent to proceed: Yes Pursuant to the emergency declaration under the [facility], 1135 waiver authority and the [employer] Act, this Virtual  Visit was conducted, with patient's (and/or legal guardian's) consent, to reduce the patient's risk of exposure to COVID-19 and provide necessary medical care. Services were provided through a video synchronous discussion virtually to substitute for in-person visit.  
 
Signed By: Juan J Turner MD

## 2020-08-28 NOTE — ED NOTES
Bedside and Verbal shift change report given to ELOY (oncoming nurse) by Gordo Cha (offgoing nurse). Report included the following information SBAR, ED Summary, MAR and Recent Results.

## 2020-08-28 NOTE — PROGRESS NOTES
8/28/2020 
8:15 AM 
Case management note EVAL done via phone with daughter Reason for Admission:   Bilateral pneumonia Patient came to hospital to [facility] in a few days. Patient is being admitted for pneumonia. Patient has history of charcot-tadeo-tooth 2, asbestos. Patient lives alone. He uses braces on his legs, walker and golf cart for outside his home. He had been able to take care of himself to the fall. Patient drives. [facility] in [facility] [phone] R/O COVID 
 
                
RUR Score:          10% Plan for utilizing home health:      PT/OT to eval 
 
PCP: First and Last name:  Dr. Alejandrina Reece Name of Practice: [facility] Are you a current patient: Yes/No: yes Approximate date of last visit: 3 months Can you participate in a virtual visit with your PCP: no 
                 
Current Advanced Directive/Advance Care Plan: yes, not on file. Daughter is going to bring in. Received copy and scanned into chart. ACP note to chart. Transition of Care Plan: 1. Home with family assistance 2. PCP follow up 3. PT/OT eval 
4. Long term planning 5. CM to follow for discharge needs Care Management Interventions PCP Verified by CM: Yes(Dr. Alejandrina Reece) Mode of Transport at Discharge: Self Current Support Network: Lives Alone Confirm Follow Up Transport: Family The Plan for Transition of Care is Related to the Following Treatment Goals : Bilateral pneumonia Discharge Location Discharge Placement: Home with family assistance [address] [place] [address]

## 2020-08-28 NOTE — ED NOTES
Bedside and Verbal shift change report given to Klaus Castillo (oncoming nurse) by Claudetta Benes  (offgoing nurse). Report included the following information SBAR, ED Summary, MAR and Recent Results.

## 2020-08-28 NOTE — ACP (ADVANCE CARE PLANNING)
8/28/2020 
10:47 AM 
 
Advance Care Planning Advance Care Planning Activator (Inpatient) Conversation Note Date of ACP Conversation: 08/28/20 Conversation Conducted with:   Daughter: Tami AMIN 
 
ACP Activator: Bibideepak Fajardos Health Care Decision Maker: 
 
Current Designated Health Care Decision Maker:   Primary Decision Maker: William El - [phone] Secondary Decision Maker: Yobany Ponce - Son-in-Law - [phone] Care Preferences Ventilation: \"If you were in your present state of health and suddenly became very ill and were unable to breathe on your own, what would your preference be about the use of a ventilator (breathing machine) if it were available to you? \" If patient would desire the use of a ventilator (breathing machine), answer \"yes\" \"If your health worsens and it becomes clear that your chance of recovery is unlikely, what would your preference be about the use of a ventilator (breathing machine) if it were available to you? \" Would the patient desire the use of a ventilator (breathing machine)? NO Resuscitation \"CPR works best to restart the heart when there is a sudden event, like a heart attack, in someone who is otherwise healthy. Unfortunately, CPR does not typically restart the heart for people who have serious health conditions or who are very sick. \" \"In the event your heart stopped as a result of an underlying serious health condition, would you want attempts to be made to restart your heart? YES [x] Yes  [] No    Decision Maker regarding differences between Advance Directives and portable DNR orders. Length of ACP Conversation in minutes:  20 minutes Conversation Outcomes: 
[x] ACP discussion completed [x] Existing advance directive reviewed with patient; no changes to patient's previously recorded wishes 
 
 [] New Advance Directive completed [] Portable Do Not Resuscitate prepared for Provider review and signature 
[] POLST/POST/MOLST/MOST prepared for Provider review and signature Follow-up plan:   
[] Schedule follow-up conversation to continue planning 
[] Referred individual to Provider for additional questions/concerns  
[] Advised patient/agent/surrogate to review completed ACP document and update if needed with changes in condition, patient preferences or care setting  
 
[] This note routed to one or more involved healthcare providers

## 2020-08-28 NOTE — ED NOTES
Bedside and Verbal shift change report given to [id number] (oncoming nurse) by Jany Hart (offgoing nurse). Report included the following information SBAR, ED Summary, MAR and Recent Results.

## 2020-08-28 NOTE — ED NOTES
Assumed care of pt at this time. Speech is garbled and left sided facial droop is noted.  is unequal, left side is lesser than right. Dr. Lizzy Smith notified and assessing pt at this time. Will order a head CT, does not want to call code stroke at this time.

## 2020-08-28 NOTE — PROGRESS NOTES
[facility]: MED RECONCILIATION Comments/Recommendations:  
 
· Patient unable to confirm name, , allergies and preferred pharmacy · Wife Ingris Schwab assisted with med rec · Pt family member states he takes lovastatin 20 mg daily and aspirin 81 mg  
· Called pt pharmacy to confirm medications and they claim he last filled lovastatin 40 mg daily in 2019 for 30 day supply Information obtained from: Family Member Allergies: Patient has no known allergies. Prior to Admission Medications:  
 
Prior to Admission Medications Prescriptions Last Dose Informant Patient Reported? Taking?  
aspirin delayed-release (ECOTRIN LOW STRENGTH) 81 mg tablet 2020 at Unknown time  Yes Yes Sig: Take 81 mg by mouth daily. lovastatin (MEVACOR) 20 mg tablet 2020 at Unknown time  Yes Yes Sig: Take 20 mg by mouth nightly. Facility-Administered Medications: None Facility-Administered Medications: None Jimbo Rivera Pharm. D. Clinical Staff Pharmacist 
Alexys Quinonez 88 [phone]

## 2020-08-29 NOTE — PROGRESS NOTES
[address] Adult  Hospitalist Group Hospitalist Progress Note Norman Easton MD 
  
  
Date of Service:  2020 NAME:  Fariha Begum :  1933 MRN:  [id number] Admission Summary:  
80-year-old male presented to the hospital after a fall complaining of back pain. He was noted to be hypoxic and a CT scan showed bilateral pneumonia, liver mass, right renal lesions and lytic lesions in the lumbar spine concerning for metastatic disease Interval history / Subjective:  
 pt has no complaints currently Assessment & Plan:  
 
Bilateral pneumonia 
-COVID negative x1, will check again 
-RVP neg 
-Follow-up sputum cultures, mycoplasma, Legionella, S pneumo-Continue empiric Zosyn and doxycycline 
-Pulmonary consulted UTI 
-Follow cultures, positive for gram-negative rods 
-Antibiotics as above Sepsis 
-Secondary to above 
-Blood cultures positive  for gram-negative rods in both bottles Liver mass/renal mass/lytic bone lesions 
-Unclear primary 
-Oncology consulted, recommends MRI abdomen and biopsy on Monday Hypokalemia 
-Likely due to intravascular volume depletion 
-Replete and monitor Charcot Yasmin tooth disease type II 
-Follows at [facility] 
-Continue supportive care and foot orthotics Code status: Full DVT prophylaxis: [facility] Problems  Date Reviewed: 2020 Codes Class Noted POA Liver mass ICD-10-CM: R16.0 ICD-9-CM: 573.8  2020 Yes Renal mass, right ICD-10-CM: N28.89 ICD-9-CM: 593.9  2020 Yes Dyslipidemia ICD-10-CM: E78.5 ICD-9-CM: 272.4  2020 Yes Hypokalemia ICD-10-CM: E87.6 ICD-9-CM: 276.8  2020 Yes  
   
 UTI (urinary tract infection) ICD-10-CM: N39.0 ICD-9-CM: 599.0  2020 Yes Charcot-Yasmin-Tooth disease type 2 ICD-10-CM: G60.0 ICD-9-CM: 356.1  2020 Yes * (Principal) Bilateral pneumonia ICD-10-CM: J18.9 ICD-9-CM: 907  8/27/2020 Yes Review of Systems: A comprehensive review of systems was negative except for that written in the HPI. Vital Signs:  
 Last 24hrs VS reviewed since prior progress note. Most recent are: 
Visit Vitals /60 (BP Patient Position: At rest) Pulse 83 Temp 98.1 °F (36.7 °C) Resp 20 Ht 5' 11\" (1.803 m) Wt 89 kg (196 lb 3.4 oz) SpO2 95% BMI 27.37 kg/m² Intake/Output Summary (Last 24 hours) at 8/29/2020 1011 Last data filed at 8/29/2020 0288 Gross per 24 hour Intake  Output 400 ml Net -400 ml Physical Examination:  
 
 
     
Constitutional:  No acute distress, cooperative, pleasant ENT:  Oral mucosa moist, oropharynx benign. Resp:  CTA bilaterally. No wheezing/rhonchi/rales. No accessory muscle use CV:  Regular rhythm, normal rate, no murmurs, gallops, rubs GI:  Soft, non distended, non tender. normoactive bowel sounds, no hepatosplenomegaly Musculoskeletal:  No edema, warm, 2+ pulses throughout Neurologic:  Moves all extremities. AAOx3, CN II-XII reviewed Psych:  Good insight, Not anxious nor agitated. Data Review:  
 Review and/or order of clinical lab test 
 
 
Labs:  
 
Recent Labs  
  08/29/20 0039 08/28/20 0413 WBC 15.1* 16.5* HGB 14.8 13.7 HCT 44.3 40.8 * 99* Recent Labs  
  08/29/20 0039 08/28/20 0413 08/27/20 
1859  138 134* K 3.3* 3.5 3.5 * 108 103 CO2 23 20* 20* BUN 33* 37* 44* CREA 0.82 0.86 1.16  
* 133* 139* CA 8.5 8.2* 8.9 Recent Labs  
  08/29/20 0039 08/28/20 0413 08/27/20 
1859 ALT 59 37 38 * 117 133* TBILI 1.8* 1.5* 1.9* TP 7.1 6.7 7.7 ALB 2.3* 2.3* 2.6*  
GLOB 4.8* 4.4* 5.1* LPSE  --   --  22* No results for input(s): INR, PTP, APTT, INREXT, INREXT in the last 72 hours. No results for input(s): FE, TIBC, PSAT, FERR in the last 72 hours. No results found for: FOL, RBCF No results for input(s): PH, PCO2, PO2 in the last 72 hours. Recent Labs  
  08/27/20 1859 08/27/20 1857 CPK  --  73  
TROIQ <0.05  -- No results found for: CHOL, CHOLX, CHLST, CHOLV, HDL, HDLP, LDL, LDLC, DLDLP, TGLX, TRIGL, TRIGP, CHHD, CHHDX No results found for: [facility] Lab Results Component Value Date/Time Color DARK YELLOW 08/27/2020 08:00 PM  
 Appearance CLOUDY (A) 08/27/2020 08:00 PM  
 Specific gravity 1.022 08/27/2020 08:00 PM  
 pH (UA) 5.5 08/27/2020 08:00 PM  
 Protein 100 (A) 08/27/2020 08:00 PM  
 Glucose Negative 08/27/2020 08:00 PM  
 Ketone 80 (A) 08/27/2020 08:00 PM  
 Bilirubin Negative 08/27/2020 08:00 PM  
 Urobilinogen 1.0 08/27/2020 08:00 PM  
 Nitrites Positive (A) 08/27/2020 08:00 PM  
 Leukocyte Esterase SMALL (A) 08/27/2020 08:00 PM  
 Epithelial cells FEW 08/27/2020 08:00 PM  
 Bacteria 2+ (A) 08/27/2020 08:00 PM  
 WBC 5-10 08/27/2020 08:00 PM  
 RBC 5-10 08/27/2020 08:00 PM  
 
 
 
Medications Reviewed:  
 
Current Facility-Administered Medications Medication Dose Route Frequency  sodium chloride (NS) flush 5-40 mL  5-40 mL IntraVENous Q8H  
 sodium chloride (NS) flush 5-40 mL  5-40 mL IntraVENous PRN  
 acetaminophen (TYLENOL) tablet 650 mg  650 mg Oral Q6H PRN Or  
 acetaminophen (TYLENOL) suppository 650 mg  650 mg Rectal Q6H PRN  polyethylene glycol (MIRALAX) packet 17 g  17 g Oral DAILY PRN  promethazine (PHENERGAN) tablet 12.5 mg  12.5 mg Oral Q6H PRN Or  
 ondansetron (ZOFRAN) injection 4 mg  4 mg IntraVENous Q6H PRN  
 doxycycline (VIBRAMYCIN) 100 mg in 0.9% sodium chloride (MBP/ADV) 100 mL  100 mg IntraVENous Q12H  
 sodium chloride (NS) flush 5-10 mL  5-10 mL IntraVENous PRN  piperacillin-tazobactam (ZOSYN) 3.375 g in 0.9% sodium chloride (MBP/ADV) 100 mL  3.375 g IntraVENous Q8H  
 
______________________________________________________________________ EXPECTED LENGTH OF STAY: 4d 19h ACTUAL LENGTH OF STAY:          2 Ajay Tavera MD

## 2020-08-29 NOTE — PROGRESS NOTES
Bedside and Verbal shift change report given to Andi Warren (oncoming nurse) by Janine Nicole  (offgoing nurse). Report included the following information SBAR, Kardex, Intake/Output, MAR, Accordion and Recent Results. Bedside and Verbal shift change report given to [facility] (oncoming nurse) by Andi Warren (offgoing nurse). Report included the following information SBAR, Kardex, Intake/Output, MAR, Accordion, Recent Results and Cardiac Rhythm NSR.

## 2020-08-29 NOTE — ED NOTES
TRANSFER - OUT REPORT: 
 
Verbal report given to Ranjith Mcconnell RN (name) on Juan Galindo  being transferred to [facility] (unit) for routine progression of care Report consisted of patients Situation, Background, Assessment and  
Recommendations(SBAR). Information from the following report(s) SBAR, Kardex, Intake/Output, MAR, Recent Results and Cardiac Rhythm NSR was reviewed with the receiving nurse. Lines:  
Peripheral IV 08/27/20 Left Forearm (Active) Site Assessment Clean, dry, & intact 08/27/20 1855 Phlebitis Assessment 0 08/27/20 1855 Infiltration Assessment 0 08/27/20 1855 Dressing Status Clean, dry, & intact 08/27/20 1855 Dressing Type Transparent 08/27/20 1855 Hub Color/Line Status Pink;Flushed;Patent 08/27/20 1855 Action Taken Blood drawn 08/27/20 1855 Peripheral IV 08/27/20 Right Antecubital (Active) Opportunity for questions and clarification was provided. Patient transported with: 
 Monitor Tech

## 2020-08-29 NOTE — PROGRESS NOTES
Bedside and Verbal shift change report given to Lorie Coy (oncoming nurse) by Shelby Astorga RN (offgoing nurse). Report included the following information SBAR, Kardex, Intake/Output, Accordion and Recent Results. SHIFT REPORT: 
 
1000: provided daughter edith with update Care for patient provided by Claudia Sanchez RN Bedside and Verbal shift change report given to Nnamdi Pearce RN  (oncoming nurse) by Thien Eldridge RN  (offgoing nurse). Report included the following information SBAR, Kardex, Intake/Output, Accordion and Recent Results.

## 2020-08-29 NOTE — PROGRESS NOTES
[address] Adult  Hospitalist Group Hospitalist Progress Note Ajay Tavera MD 
  
  
Date of Service:  2020 NAME:  Shruthi Love :  1933 MRN:  [id number] Admission Summary:  
19-year-old male presented to the hospital after a fall complaining of back pain. He was noted to be hypoxic and a CT scan showed bilateral pneumonia, liver mass, right renal lesions and lytic lesions in the lumbar spine concerning for metastatic disease Interval history / Subjective:  
 pt has no complaints currently, still having a cough however. Assessment & Plan:  
 
Bilateral pneumonia 
-COVID negative x1, will check again 
-RVP neg 
-Follow-up sputum cultures, mycoplasma, Legionella, S pneumo-Continue empiric Zosyn and doxycycline 
-Pulmonary consulted UTI 
-Follow cultures, positive for gram-negative rods 
-Antibiotics as above Sepsis 
-Secondary to above 
-Blood cultures positive  for gram-negative rods in both bottles Liver mass/renal mass/lytic bone lesions 
-Unclear primary 
-Oncology consulted, recommends MRI abdomen and biopsy on Monday Hypokalemia 
-Likely due to intravascular volume depletion 
-Replete and monitor Charcot Yasmin tooth disease type II 
-Follows at [facility] 
-Continue supportive care and foot orthotics Code status: Full DVT [facility]: [facility] Problems  Date Reviewed: 2020 Codes Class Noted POA Liver mass ICD-10-CM: R16.0 ICD-9-CM: 573.8  2020 Yes Renal mass, right ICD-10-CM: N28.89 ICD-9-CM: 593.9  2020 Yes Dyslipidemia ICD-10-CM: E78.5 ICD-9-CM: 272.4  2020 Yes Hypokalemia ICD-10-CM: E87.6 ICD-9-CM: 276.8  2020 Yes  
   
 UTI (urinary tract infection) ICD-10-CM: N39.0 ICD-9-CM: 599.0  2020 Yes Charcot-Yasimn-Tooth disease type 2 ICD-10-CM: G60.0 ICD-9-CM: 356.1  2020 Yes * (Principal) Bilateral pneumonia ICD-10-CM: J18.9 ICD-9-CM: 879  8/27/2020 Yes Review of Systems: A comprehensive review of systems was negative except for that written in the HPI. Vital Signs:  
 Last 24hrs VS reviewed since prior progress note. Most recent are: 
Visit Vitals /60 (BP Patient Position: At rest) Pulse 83 Temp 98.1 °F (36.7 °C) Resp 20 Ht 5' 11\" (1.803 m) Wt 89 kg (196 lb 3.4 oz) SpO2 95% BMI 27.37 kg/m² Intake/Output Summary (Last 24 hours) at 8/29/2020 1122 Last data filed at 8/29/2020 7087 Gross per 24 hour Intake  Output 400 ml Net -400 ml Physical Examination:  
 
 
     
Constitutional:  No acute distress, cooperative, pleasant ENT:  Oral mucosa moist, oropharynx benign. Resp:  CTA bilaterally. No wheezing/rhonchi/rales. No accessory muscle use CV:  Regular rhythm, normal rate, no murmurs, gallops, rubs GI:  Soft, non distended, non tender. normoactive bowel sounds, no hepatosplenomegaly Musculoskeletal:  No edema, warm, 2+ pulses throughout Neurologic:  Moves all extremities. AAOx3, CN II-XII reviewed Psych:  Good insight, Not anxious nor agitated. Data Review:  
 Review and/or order of clinical lab test 
 
 
Labs:  
 
Recent Labs  
  08/29/20 0039 08/28/20 0413 WBC 15.1* 16.5* HGB 14.8 13.7 HCT 44.3 40.8 * 99* Recent Labs  
  08/29/20 0039 08/28/20 0413 08/27/20 
1859  138 134* K 3.3* 3.5 3.5 * 108 103 CO2 23 20* 20* BUN 33* 37* 44* CREA 0.82 0.86 1.16  
* 133* 139* CA 8.5 8.2* 8.9 Recent Labs  
  08/29/20 0039 08/28/20 0413 08/27/20 
1859 ALT 59 37 38 * 117 133* TBILI 1.8* 1.5* 1.9* TP 7.1 6.7 7.7 ALB 2.3* 2.3* 2.6*  
GLOB 4.8* 4.4* 5.1* LPSE  --   --  22* No results for input(s): INR, PTP, APTT, INREXT, INREXT in the last 72 hours. No results for input(s): FE, TIBC, PSAT, FERR in the last 72 hours. No results found for: FOL, RBCF No results for input(s): PH, PCO2, PO2 in the last 72 hours. Recent Labs  
  08/27/20 1859 08/27/20 1857 CPK  --  73  
TROIQ <0.05  -- No results found for: CHOL, CHOLX, CHLST, CHOLV, HDL, HDLP, LDL, LDLC, DLDLP, TGLX, TRIGL, TRIGP, CHHD, CHHDX No results found for: [facility] Lab Results Component Value Date/Time Color DARK YELLOW 08/27/2020 08:00 PM  
 Appearance CLOUDY (A) 08/27/2020 08:00 PM  
 Specific gravity 1.022 08/27/2020 08:00 PM  
 pH (UA) 5.5 08/27/2020 08:00 PM  
 Protein 100 (A) 08/27/2020 08:00 PM  
 Glucose Negative 08/27/2020 08:00 PM  
 Ketone 80 (A) 08/27/2020 08:00 PM  
 Bilirubin Negative 08/27/2020 08:00 PM  
 Urobilinogen 1.0 08/27/2020 08:00 PM  
 Nitrites Positive (A) 08/27/2020 08:00 PM  
 Leukocyte Esterase SMALL (A) 08/27/2020 08:00 PM  
 Epithelial cells FEW 08/27/2020 08:00 PM  
 Bacteria 2+ (A) 08/27/2020 08:00 PM  
 WBC 5-10 08/27/2020 08:00 PM  
 RBC 5-10 08/27/2020 08:00 PM  
 
 
 
Medications Reviewed:  
 
Current Facility-Administered Medications Medication Dose Route Frequency  potassium chloride SR (KLOR-CON 10) tablet 40 mEq  40 mEq Oral NOW  sodium chloride (NS) flush 5-40 mL  5-40 mL IntraVENous Q8H  
 sodium chloride (NS) flush 5-40 mL  5-40 mL IntraVENous PRN  
 acetaminophen (TYLENOL) tablet 650 mg  650 mg Oral Q6H PRN Or  
 acetaminophen (TYLENOL) suppository 650 mg  650 mg Rectal Q6H PRN  polyethylene glycol (MIRALAX) packet 17 g  17 g Oral DAILY PRN  promethazine (PHENERGAN) tablet 12.5 mg  12.5 mg Oral Q6H PRN Or  
 ondansetron (ZOFRAN) injection 4 mg  4 mg IntraVENous Q6H PRN  
 doxycycline (VIBRAMYCIN) 100 mg in 0.9% sodium chloride (MBP/ADV) 100 mL  100 mg IntraVENous Q12H  
 sodium chloride (NS) flush 5-10 mL  5-10 mL IntraVENous PRN  piperacillin-tazobactam (ZOSYN) 3.375 g in 0.9% sodium chloride (MBP/ADV) 100 mL  3.375 g IntraVENous Q8H  
 
 ______________________________________________________________________ EXPECTED LENGTH OF STAY: 4d 19h ACTUAL LENGTH OF STAY:          2 Katy Cleveland MD

## 2020-08-29 NOTE — PROGRESS NOTES
2022: Received report from [facility]. 2115: TRANSFER - IN REPORT: 
 
Verbal report received from Claudia RN(name) on Genaro Sparrow  being received from ED(unit) for routine progression of care Report consisted of patients Situation, Background, Assessment and  
Recommendations(SBAR). Information from the following report(s) SBAR, Kardex, ED Summary, Intake/Output, MAR and Recent Results was reviewed with the receiving nurse. Opportunity for questions and clarification was provided. Assessment completed upon patients arrival to unit and care assumed. 2230: Daughter called for updates. 0600: No acute changes overnight. 0715: Bedside and Verbal shift change report given to [address] [place] [address] and Burak Neri RN (oncoming nurse) by Shane Richter RN (offgoing nurse). Report included the following information SBAR, Kardex, Intake/Output and Recent Results.

## 2020-08-30 NOTE — PROGRESS NOTES
Kiran Vega [facility] 79 
[address] [facility], [place], [id number] [place] [address] 
[phone] Medical Progress Note NAME:         Phil Vizcarra :        1933 MRM:        [id number] Date of service: 2020 Subjective: Patient has been seen and examined as a follow up for multiple medical issues. Chart, labs, diagnostics reviewed. Generalized weakness, SOB, hypoxic. He says he did not sleep well last night. No nausea or vomiting Objective: 
 
Vital Signs: 
 
Visit Vitals /90 Pulse 98 Temp 99.1 °F (37.3 °C) Resp 23 Ht 5' 11\" (1.803 m) Wt 89.8 kg (198 lb) SpO2 95% BMI 27.62 kg/m² Intake/Output Summary (Last 24 hours) at 2020 4725 Last data filed at 2020 3400 Gross per 24 hour Intake 1440 ml Output 545 ml Net 895 ml Physical Examination: 
 
General:   Weak and ill looking male, not in any acute distress Eyes:   pink conjunctivae, PERRLA with no discharge. ENT:   no ottorrhea or rhinorrhea with dry mucous membranes Neck: no masses, thyroid non-tender and trachea central. 
Pulm:  no accessory muscle use, decreased breath sounds with some crackles. No wheezes Card:  no JVD or murmurs, has regular and normal S1, S2 without thrills, bruits or peripheral edema Abd:  Soft, non-tender, + distended, normoactive bowel sounds with no palpable organomegaly Musc:  No cyanosis, clubbing, atrophy or deformities. Skin:  No rashes, bruising or ulcers. Neuro: Awake and alert. Generally a non focal exam. Follows commands appropriately Psych:  Has a fair insight and is oriented x 3 Current Facility-Administered Medications Medication Dose Route Frequency  sodium chloride (NS) flush 5-40 mL  5-40 mL IntraVENous Q8H  
 sodium chloride (NS) flush 5-40 mL  5-40 mL IntraVENous PRN  
 acetaminophen (TYLENOL) tablet 650 mg  650 mg Oral Q6H PRN  Or  
  acetaminophen (TYLENOL) suppository 650 mg  650 mg Rectal Q6H PRN  polyethylene glycol (MIRALAX) packet 17 g  17 g Oral DAILY PRN  promethazine (PHENERGAN) tablet 12.5 mg  12.5 mg Oral Q6H PRN Or  
 ondansetron (ZOFRAN) injection 4 mg  4 mg IntraVENous Q6H PRN  
 doxycycline (VIBRAMYCIN) 100 mg in 0.9% sodium chloride (MBP/ADV) 100 mL  100 mg IntraVENous Q12H  
 sodium chloride (NS) flush 5-10 mL  5-10 mL IntraVENous PRN  piperacillin-tazobactam (ZOSYN) 3.375 g in 0.9% sodium chloride (MBP/ADV) 100 mL  3.375 g IntraVENous Q8H Laboratory data and review: 
 
Recent Labs 08/30/20 
3076 08/29/20 
9847 08/28/20 
0413 WBC 18.3* 15.1* 16.5* HGB 14.0 14.8 13.7 HCT 41.6 44.3 40.8  121* 99* Recent Labs 08/30/20 
9316 08/29/20 
0303 08/28/20 
0413 08/27/20 
1859  140 138 134* K 3.4* 3.3* 3.5 3.5 * 109* 108 103 CO2 21 23 20* 20* * 121* 133* 139* BUN 30* 33* 37* 44* CREA 0.68* 0.82 0.86 1.16  
CA 8.4* 8.5 8.2* 8.9 ALB  --  2.3* 2.3* 2.6* ALT  --  59 37 38 No components found for: [facility]: 
 
Telemetry reviewed by me:   normal sinus rhythm Assessment and Plan: 
 
Bilateral pneumonia (8/27/2020) / Sepsis POA: SARS-CoV 2 neg. Mycoplasma IgM neg. Other pneumonia serologies pending. Continue IV Ceftriaxone and Doxycycline. Oxygen as needed  
  
UTI (urinary tract infection) (8/28/2020) POA: apparently recently treated for a UTi. Blood Cx isolated E coli and urine cx grew E coli and proteus species. Change IV antibiotics to IV Ceftriaxone. Repeat blood cultures in AM   
 
Liver mass (8/28/2020) / Renal mass, right (8/28/2020) POA: he also has lytic lesions in the lumbar spine concerning for metastasis. Unclear primary. MRi abdomen and CT guided liver biopsies pending. Oncology following Hypokalemia (8/28/2020) / Hyponatremia POA: mild.  Replete and follow K+  
  
 Charcot-Yasmin-Tooth disease type 2 (8/28/2020) POA: follows up at [facility] and uses bilateral ankle foot orthotics. Supportive care for now 
  
Dyslipidemia (8/28/2020) POA: hold Mevacor Total time spent for the patient's care: 35 Minutes Care Plan discussed with: Patient and Nursing Staff Discussed:  Care Plan and D/C Planning Prophylaxis:  SCD's Anticipated Disposition:  Home w/Family 
        
___________________________________________________ Attending Physician:   Kathie Vyas MD

## 2020-08-31 PROBLEM — D18.03 LIVER HEMANGIOMA: Status: ACTIVE | Noted: 2020-01-01

## 2020-08-31 NOTE — ADT AUTH CERT NOTES
8/31/20 Clinical request by Noon by Nickolas Adams RN  
 
   
Review Status  Review Entered In Primary  8/31/2020 12:16   
   
Criteria Review 8/31/20 
  
No progress notes available Cardiology not following 
  
VS  T 99   P 93   B/P 107/60   R 16   SpO2 94 %   3L NC 
  
Results:  BUN 28 Creat 0.60 Blood cultures pending 
  
  
Medications: 
Rocephin IV q24h, Doxycycline IV q12h,  
  
  
Orders: 
CT ABD w/wo Contrast, CT Bx Liver Ndl Perc, NPO, SCDs, I&O, Up ad trupti RT Wean per RT oxygen protocol  
   
8/30/20 Requested clinical by Nickolas Adams RN  
 
   
Review Status  Review Entered In Primary  8/31/2020 12:08   
   
Criteria Review 8/30/20 Generalized weakness, SOB, hypoxic. He says he did not sleep well last night. No nausea or vomiting 
  
Physical Examination: 
  
General:   Weak and ill looking male, not in any acute distress  
Eyes:   pink conjunctivae, PERRLA with no discharge. ENT: [place] or rhinorrhea with dry mucous membranes Neck: no masses, thyroid non-tender and trachea central. 
Pulm:  no accessory muscle use, decreased breath sounds with some crackles. No wheezes Card:  no JVD or murmurs, has regular and normal S1, S2 without thrills, bruits or peripheral edema Abd:  Soft, non-tender, + distended, normoactive bowel sounds with no palpable organomegaly Musc:  No cyanosis, clubbing, atrophy or deformities. Skin:  No rashes, bruising or ulcers. Neuro: Awake and alert. Generally a non focal exam. Follows commands appropriately Psych:  Has a fair insight and is oriented x 3 
  
Assessment and Plan: 
  
Bilateral pneumonia (8/27/2020) / Sepsis POA: SARS-CoV 2 neg. Mycoplasma IgM neg. Other pneumonia serologies pending.  Continue IV Ceftriaxone and Doxycycline. Oxygen as needed  
  
UTI (urinary tract infection) (8/28/2020) POA: apparently recently treated for a UTi. Blood Cx isolated E coli and urine cx grew E coli and proteus species. Change IV antibiotics to IV Ceftriaxone. Repeat blood cultures in AM   
  
Liver mass (8/28/2020) / Renal mass, right (8/28/2020) POA: he also has lytic lesions in the lumbar spine concerning for metastasis. Unclear primary.  MRi abdomen and CT guided liver biopsies pending. Oncology following 
  
Hypokalemia (8/28/2020) / Hyponatremia POA: mild. Replete and follow K+  
  
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 Supportive care for now 
  
Dyslipidemia (8/28/2020) POA: hold Mevacor  
  
VS  T 99.2   P 98   B/P 95/65   R 30   SpO2 94 %   3L NC 
  
RN Notes 0300: O2 in low 90s, placed on 2L NC 
0730: When at [facility] for report, patient had O2 out, saturation in 80s, patient said he felt light headed and ill and like he couldn't go to MRI. Placed back on oxygen, encouraged to take deep breaths and try to relax.  
  
Results: WBC 18.3 K 3.4   Creat 0.68 SARS-CoV-2 NOT Detected, report date 8/29 2147 
  
Medications: 
Rocephin IV daily, Doxycycline IV q12h, Lasix IV once, Zosyn IV q8h x 1  
   
Pneumonia - Care Day 3 (8/29/2020) by Anna Coelho RN  
 
   
Review Status  Review Entered Completed  8/31/2020 11:58   
   
Criteria Review Care Day: 3 Care Date: 8/29/2020 Level of Care: Telemetry Guideline Day 2 Level Of Care (X) Floor 8/31/2020 11:58:25 EDT by Selvin Campbell Clinical Status   
(X) * No CO2 retention or acidosis 8/31/2020 11:58:25 EDT by Saskia Marr venous 23   
(X) * No requirement for mechanical ventilation   
(X) * Hypotension absent 8/31/2020 11:58:25 EDT by Osvaldo Crum   
  105/80   
(X) * Afebrile or fever improved 8/31/2020 11:58:25 EDT by Osvaldo Crum   
  t max 99.3   
(X) * No hypoxia on room air or oxygenation improved ( ) * Mental status improved or at baseline Activity ( ) * Increased activity Medications (X) IV or oral antibiotics 8/31/2020 11:58:25 EDT by Ethel Keto zosyn & doxy * Milestone Additional Notes 8/29   
 pt has no complaints currently, still having a cough however. Constitutional: No acute distress, cooperative, pleasant    
ENT: Oral mucosa moist, oropharynx benign. Resp: CTA bilaterally. No wheezing/rhonchi/rales. No accessory muscle use CV: Regular rhythm, normal rate, no murmurs, gallops, rubs   
 GI: Soft, non distended, non tender. normoactive bowel sounds, no hepatosplenomegaly   
 Musculoskeletal: No edema, warm, 2+ pulses throughout   
 Neurologic: Moves all extremities.  AAOx3, CN II-XII reviewed   
                        Psych:  Good insight, Not anxious nor agitated Bilateral pneumonia   
-COVID negative x1, will check again   
-RVP neg   
-Follow-up sputum cultures, mycoplasma, Legionella, S pneumo-Continue empiric Zosyn and doxycycline   
-Pulmonary consulted   
    
UTI   
-Follow cultures, positive for gram-negative rods   
-Antibiotics as above   
    
Sepsis   
-Secondary to above   
-Blood cultures positive  for gram-negative rods in both bottles   
    
Liver mass/renal mass/lytic bone lesions   
-Unclear primary   
-Oncology consulted, recommends MRI abdomen and biopsy on Monday   
    
Hypokalemia   
-Likely due to intravascular volume depletion   
-Replete and monitor   
    
Charcot Yasmin tooth disease type II   
-Follows at [place] Check   
-Continue supportive care and foot orthotics Hematology/Oncology note Heme/ONC on call MRI abd still pending For liver biopsy Monday Call if questions VS  T 99.3   P 108   B/P 105/80   R 23   SpO2 93 %   RA Results:     
WBC  15.1 Platelet 917   
K 3.3  Mycoplasma AB, IgM NONREACTIVE Medications:   
Doxycycline IV q12h, Zosyn IV q8h

## 2020-08-31 NOTE — PROGRESS NOTES
Shift Summary 1930: Bedside and Verbal shift change report given to Etelvina Bellamy RN (oncoming nurse) by Shalini Diaz RN (offgoing nurse). Report included the following information SBAR, Kardex, Procedure Summary, Intake/Output, MAR, Accordion, Recent Results and Cardiac Rhythm NSR.  
 
2100: Patient resting well this evening. Currently denies shortness of breath 
 
0030: Patient now NPO for MRI/Biopsy in AM. Reinforced this education, removed drinks from bedside table. 0730: Bedside and Verbal shift change report given to Ayaan Powell and Nicol Schroeder, RN (oncoming nurse) by Etelvina Bellamy RN (offgoing nurse). Report included the following information SBAR, Kardex, Procedure Summary, Intake/Output, MAR, Accordion, Recent Results and Cardiac Rhythm NSR.

## 2020-08-31 NOTE — PROGRESS NOTES
Bedside and Verbal shift change report given to Eric Nino (oncoming nurse) by Mary Yu (offgoing nurse). Report included the following information SBAR, Kardex, Procedure Summary, Intake/Output, MAR, Recent Results and Med Rec Status.

## 2020-08-31 NOTE — PROGRESS NOTES
Kiran Silvia [facility] 79 
380 [facility], [address] [place] [address] 
[phone] Medical Progress Note NAME:         Agustin Elmore :        1933 MRM:        [id number] Date of service: 2020 Subjective: Patient has been seen and examined as a follow up for multiple medical issues. Chart, labs, diagnostics reviewed. Generalized weakness, less SOB, hypoxic. Denies any abdominal discomfort. No nausea or vomiting. Objective: 
 
Vital Signs: 
 
Visit Vitals BP (!) 127/95 (BP Patient Position: At rest) Pulse (!) 102 Temp 98.1 °F (36.7 °C) Resp 18 Ht 5' 11\" (1.803 m) Wt 87.9 kg (193 lb 11.2 oz) SpO2 95% BMI 27.02 kg/m² Intake/Output Summary (Last 24 hours) at 2020 1304 Last data filed at 2020 2510 Gross per 24 hour Intake 1050 ml Output 3050 ml Net -2000 ml Physical Examination: 
 
General:   Weak and ill looking male, not in any acute distress Eyes:   pink conjunctivae, PERRLA with no discharge. ENT:   no ottorrhea or rhinorrhea with dry mucous membranes Neck: no masses, thyroid non-tender and trachea central. 
Pulm:  decreased breath sounds with some crackles. No wheezes Card:  no JVD or murmurs, has regular and normal S1, S2 without thrills, bruits or peripheral edema Abd:  Soft, non-tender, + distended, normoactive bowel sounds Musc:  No cyanosis, clubbing, atrophy or deformities. Skin:  No rashes, bruising or ulcers. Neuro: Awake and alert. Generally a non focal exam. Follows commands appropriately Psych:  Has a fair insight and is oriented x 3 Current Facility-Administered Medications Medication Dose Route Frequency  cefTRIAXone (ROCEPHIN) 2 g in sterile water (preservative free) 20 mL IV syringe  2 g IntraVENous DAILY  sodium chloride (NS) flush 5-40 mL  5-40 mL IntraVENous Q8H  
  sodium chloride (NS) flush 5-40 mL  5-40 mL IntraVENous PRN  
 acetaminophen (TYLENOL) tablet 650 mg  650 mg Oral Q6H PRN Or  
 acetaminophen (TYLENOL) suppository 650 mg  650 mg Rectal Q6H PRN  polyethylene glycol (MIRALAX) packet 17 g  17 g Oral DAILY PRN  promethazine (PHENERGAN) tablet 12.5 mg  12.5 mg Oral Q6H PRN Or  
 ondansetron (ZOFRAN) injection 4 mg  4 mg IntraVENous Q6H PRN  
 doxycycline (VIBRAMYCIN) 100 mg in 0.9% sodium chloride (MBP/ADV) 100 mL  100 mg IntraVENous Q12H  
 sodium chloride (NS) flush 5-10 mL  5-10 mL IntraVENous PRN Laboratory data and review: 
 
Recent Labs 08/30/20 
2220 08/29/20 
9137 WBC 18.3* 15.1* HGB 14.0 14.8 HCT 41.6 44.3  121* Recent Labs 08/31/20 
4757 08/30/20 
7712 08/29/20 
6358  140 140  
K 3.9 3.4* 3.3*  
 110* 109* CO2 23 21 23 * 132* 121* BUN 28* 30* 33* CREA 0.60* 0.68* 0.82 CA 8.4* 8.4* 8.5 ALB  --   --  2.3* ALT  --   --  59 No components found for: [facility]: 
 
Telemetry reviewed by me:   normal sinus rhythm Assessment and Plan: 
 
Bilateral pneumonia (8/27/2020) / Sepsis POA: SARS-CoV 2 neg. Mycoplasma IgM and Legionella Ag neg. Continue IV Ceftriaxone and Doxycycline. Oxygen as needed. Ambulate as tolerated 
  
UTI (urinary tract infection) (8/28/2020) POA: apparently recently treated for a UTi. Blood Cx isolated E coli and urine cx grew E coli and proteus species. Continue IV Ceftriaxone. Follow surveillance blood cultures that remain neg thus far. Renal mass, right (8/28/2020) POA: he also has lytic lesions in the lumbar spine concerning for metastasis. CT with contrast concerning for RCC. Oncology following. 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 Supportive care for now 
  
Dyslipidemia (8/28/2020) POA: hold Mevacor Liver hemangioma (8/31/2020) POA: CT abdomen confirms a benign hemangioma. No further testing Total time spent for the patient's care: 35 Minutes Care Plan discussed with: Patient, Nursing Staff and consulting physicians Discussed:  Care Plan and D/C Planning Prophylaxis:  SCD's Anticipated Disposition:  Home w/Family  
 
 
 
        
___________________________________________________ Attending Physician:   Ramón Sanches MD

## 2020-08-31 NOTE — PROGRESS NOTES
0700: Bedside shift change report given to Doroteo Webster and Ethan Oliveira RN (oncoming nurse) by Mary Yu RN (offgoing nurse). Report included the following information SBAR, Kardex, Intake/Output, MAR and Cardiac Rhythm NSR.  
 
1122: Patient off the floor to CT.

## 2020-08-31 NOTE — PROGRESS NOTES
Shift Change:  
 
Bedside and Verbal shift change report given to Chiki Dwyer (oncoming nurse) by Dany Hopkins RN (offgoing nurse). Report included the following information SBAR, Kardex and Recent Results. Shift Summary: 
 
1935: Pnt's temp is 99.8, heart rate 104, and is slightly diaphoretic. PO Tylenol admin 
 
0150: pnt incontinent of urine. CHG given, linens changed, and condom catheter applied. Shift Change:  
 
Bedside and Verbal shift change report given to Nancy Redd and Santi RN (oncoming nurse) by Chiki Dwyer (offgoing nurse). Report included the following information SBAR, Kardex and Recent Results.

## 2020-08-31 NOTE — PROGRESS NOTES
[facility] [place] [facility] [place] [address] [address] [facility], [address] [place] [address] Road W: [phone]  F: [phone] Reason for Visit:  
Angelica Stanton is a 80 y.o. male who isseen in consultation at the request of Dr. Kenyatta Gray for evaluation of Liver mass, right renal mass, lytic lumbar spine concerning for metastatic disease. History of Present Illness:  
Mr. Angelica Stanton is an 79 y/o male admitted 8/28/2020 with c/o fall, lower back pain, found to have liver mass, bone lesions. In ED, daughter reports pt fell 4 days ago. Pt is a poor historian. Hx of Charcot-Yasmin-Tooth disease. ED labs notable for WBC 17.1 , , T-bili 1.9. CT scan shows bilateral PNA; liver mass, possible R renal lesion and lytic lesions in the lumbar spine concerning for metastatic disease. He was admitted for further eval and management. Interval History:  
 
Feels tired. Denies O2 use at home. Occasionally has a cough; yellow tinged sputum with small amt of blood noted; Unsure about last BM. Denies pain. States uses a walker at home. Wishes he could move around more than he does. No family at bedside. No past medical history on file. Past Surgical History:  
Procedure Laterality Date  HX ORTHOPAEDIC    
 NEUROLOGICAL PROCEDURE UNLISTED Social History Tobacco Use  Smoking status: Never Smoker  Smokeless tobacco: Never Used Substance Use Topics  Alcohol use: Not Currently Family History Problem Relation Age of Onset  Heart Disease Mother  Heart Disease Father  Parkinsonism Paternal Uncle Current Facility-Administered Medications Medication Dose Route Frequency  cefTRIAXone (ROCEPHIN) 2 g in sterile water (preservative free) 20 mL IV syringe  2 g IntraVENous DAILY  sodium chloride (NS) flush 5-40 mL  5-40 mL IntraVENous Q8H  
 sodium chloride (NS) flush 5-40 mL  5-40 mL IntraVENous PRN  
  acetaminophen (TYLENOL) tablet 650 mg  650 mg Oral Q6H PRN Or  
 acetaminophen (TYLENOL) suppository 650 mg  650 mg Rectal Q6H PRN  polyethylene glycol (MIRALAX) packet 17 g  17 g Oral DAILY PRN  promethazine (PHENERGAN) tablet 12.5 mg  12.5 mg Oral Q6H PRN Or  
 ondansetron (ZOFRAN) injection 4 mg  4 mg IntraVENous Q6H PRN  
 doxycycline (VIBRAMYCIN) 100 mg in 0.9% sodium chloride (MBP/ADV) 100 mL  100 mg IntraVENous Q12H  
 sodium chloride (NS) flush 5-10 mL  5-10 mL IntraVENous PRN No Known Allergies Review of Systems: A complete review of systems was obtained, negative except as described above. Physical Exam:  
 
Visit Vitals BP (!) 127/95 (BP Patient Position: At rest) Pulse (!) 102 Temp 98.1 °F (36.7 °C) Resp 18 Ht 5' 11\" (1.803 m) Wt 87.9 kg (193 lb 11.2 oz) SpO2 95% BMI 27.02 kg/m² General: elderly, No distress Eyes: Anicteric sclerae HENT: Atraumatic Neck: Supple Respiratory: exp wheezing noted; Normal respiratory effort; )2 in use. CV: 2+ bilateral LE edema noted GI: Soft, nontender, nondistended, no masses, no hepatomegaly, no splenomegaly Skin: No rashes, ecchymoses, or petechiae Psych: Alert, oriented, appropriate affect, fair judgment/insight Results:  
 
Lab Results Component Value Date/Time WBC 18.3 (H) 08/30/2020 04:44 AM  
 HGB 14.0 08/30/2020 04:44 AM  
 HCT 41.6 08/30/2020 04:44 AM  
 PLATELET 194 03/84/9039 04:44 AM  
 MCV 92.9 08/30/2020 04:44 AM  
 ABS. NEUTROPHILS 17.1 (H) 08/30/2020 04:44 AM  
 
Lab Results Component Value Date/Time Sodium 139 08/31/2020 03:28 AM  
 Potassium 3.9 08/31/2020 03:28 AM  
 Chloride 107 08/31/2020 03:28 AM  
 CO2 23 08/31/2020 03:28 AM  
 Glucose 123 (H) 08/31/2020 03:28 AM  
 BUN 28 (H) 08/31/2020 03:28 AM  
 Creatinine 0.60 (L) 08/31/2020 03:28 AM  
 GFR est AA >60 08/31/2020 03:28 AM  
 GFR est non-AA >60 08/31/2020 03:28 AM  
 Calcium 8.4 (L) 08/31/2020 03:28 AM  
 
Lab Results Component Value Date/Time Bilirubin, total 1.8 (H) 08/29/2020 12:39 AM  
 ALT (SGPT) 59 08/29/2020 12:39 AM  
 Alk. phosphatase 137 (H) 08/29/2020 12:39 AM  
 Protein, total 7.1 08/29/2020 12:39 AM  
 Albumin 2.3 (L) 08/29/2020 12:39 AM  
 Globulin 4.8 (H) 08/29/2020 12:39 AM  
 
 
Lab Results Component Value Date/Time Lipase 22 (L) 08/27/2020 06:59 PM  
 
No results found for: INR, APTT, DDIMSQ, DDIME, 249005, Zachary Willian, FDPLT, Tyler Meeter, [address] [facility], [facility], [address], [id number], [id number], [id number], 925625, 200178, [id number], Owen Melodie No results found for: CEA, 252980, C199LT, C125, XDFM345, 2729LT, C153LT, PSA, PSALT, LDH, [id number], HCGTLT, HCGN, AFP, CHRGLT 
 
8/27/2020 XR CHEST IMPRESSION: 
1. Left-sided pleural effusion with associated passive atelectasis and/or 
consolidation. 8/27/2020 CT HEAD WO CONT IMPRESSION:  
 No acute intracranial abnormality on this noncontrast head CT. 
 
8/27/2020 CT CHEST ABD PELV W CONT IMPRESSION: 
  
1. Inflammation and gas in the left retroperitoneum around the left external 
iliac vasculature represent sequela of recent instrumentation versus nonspecific 
infection. No drainable abscess. 2. Bilateral lower lobe pneumonia more likely than atelectasis, greatest in the 
left lower lobe. 3. 4.3 cm enhancing mass in segment 3 of the liver cannot be fully characterized 
on this single phase study. 4. 1.5 cm right renal mass versus hemorrhagic cyst. 
5. Lytic lesions in the lumbar spine represent metastatic disease versus 
hemangioma. Recommendation: MRI abdomen as an outpatient to evaluate the liver and right 
renal findings. Bone scan as an outpatient. 8/31/2020 CT ABD / 3 phase liver IMPRESSION:  
1. 4.4 cm mass in hepatic segment 4A is compatible with a benign hemangioma. 2. 1.4 cm right renal mass demonstrates peripheral enhancement and is suspicious 
for renal cell carcinoma. 3. 7 mm hypodensity in hepatic segment 5 is too small to characterize. 4. Cholecystolithiasis, with no CT evidence of acute cholecystitis. 5. Bibasilar dependent airspace disease and tiny bilateral pleural effusions. 6. Additional incidental findings as detailed above. 
  
Assessment and Recommendations:  
81 yo male with unavailable PMHx admitted with lower back pain. 1. Liver mass/lytic lesions (L1 and L3) Reviewed imaging with radiology: recommend CT abd with 3 phase liver to eval liver;  Imaging  compatible with benign hemangioma 
-lytic lesions reviewed with radiology: recommend Bone scan to eval  as out patient  
-will order Bone scan as outpatient 2. Renal mass Suspicious for renal cell carcinoma Urology consulted: 3. Bilateral PNA:  
SARS-CoV-2 negative. Pulmonary evaluation. Pt on IV antibiotics, Doxy and ceftriaxone 4. Elevated LFTs/ T bili Unclear etiology Continue to monitor 5. Dvjznbq-Rlbxw-Dlwcix disease Follows at [facility]; has bilateral ankle foot orthotics Continue supportive care 6. Falls/Debility PT/OT eval 
 
 
Plan reviewed with Dr Luly Nunez Signed By: Pete Seaman, ZAIN

## 2020-08-31 NOTE — PROGRESS NOTES
Due to [address] Pandemic, CM assessment completed via EMR review and collaboration with nursing staff. No contact with patient for this assessment. Care Management follow up Patient admitted for bilateral pneumonia, fall, low back pain. Liver and renal mass. Hx Charcot foot with othotics. Asbestosis. RUR score 12%/ low risk Current status Patient continues to require medical management for pneumonia. Diagnostic testing to evaluate liver mass. Medical management continues including IV antibiotics and potassium replete, 3L/nc. Transition of Care Plan 1. Monitor patient status and response to treatment. 2. Continues with medical management and diagnostic evaluation. 3. COVID19 negative. 4. Unsure of DC needs at this time. 5. CM to follow. Roro Saab, RN, MSN/Care manager

## 2020-09-01 NOTE — PROGRESS NOTES
Problem: Mobility Impaired (Adult and Pediatric) Goal: *Acute Goals and Plan of Care (Insert Text) Description: FUNCTIONAL STATUS PRIOR TO ADMISSION: The patient was functional at the wheelchair level and was supervision for transfers to the chair. HOME SUPPORT PRIOR TO ADMISSION: The patient lived alone with no local support. Physical Therapy Goals Initiated 9/1/2020 1. Patient will move from supine to sit and sit to supine  in bed with moderate assistance  within 7 day(s). 2.  Patient will transfer from bed to chair and chair to bed with moderate assistance  using the least restrictive device within 7 day(s). 3.  Patient will perform sit to stand with moderate assistance  within 7 day(s). 4.  Patient will ambulate with moderate assistance  for 15 feet with the least restrictive device within 7 day(s). Outcome: Progressing Towards Goal 
Note: PHYSICAL THERAPY EVALUATION Patient: Phil Vizcarra (64 y.o. male) Date: 9/1/2020 Primary Diagnosis: Bilateral pneumonia [J18.9] Precautions:   Fall ASSESSMENT Based on the objective data described below, the patient presents with decreased strength, balance, activity tolerance and inability to transfer following admission for bilateral PNA and resulting to have found a liver and renal mass. Patient with increased debility prior to admission, increased fall history and was wheelchair bound per patient x months. He initially declined mobility due to back pain, he was premedicated by nursing and able to participate with therapy. Patient today requires 2 person assist for all mobility. He attempted standing x2 but unable to advance LE to take steps. Requires return to supine. Patient is now requiring supplemental O2 at 2L NC. Patient vitals were stable throughout, although initial sitting he reports dizziness.  
 
Current Level of Function Impacting Discharge (mobility/balance): MOD A x2 for supine-sit, MAX A x 2 for sit-supine. MAX A x2 for sit-stand with RW 
 
Functional Outcome Measure: The patient scored Total: 15/100 on the Barthel Index which is indicative of 85% impaired ability to care for basic self needs/dependency on others. Other factors to consider for discharge:  
  
Patient will benefit from skilled therapy intervention to address the above noted impairments. PLAN : 
Recommendations and Planned Interventions: bed mobility training, transfer training, gait training, therapeutic exercises, neuromuscular re-education, and therapeutic activities Frequency/Duration: Patient will be followed by physical therapy:  5 times a week to address goals. Recommendation for discharge: (in order for the patient to meet his/her long term goals) Therapy up to 5 days/week in SNF setting This discharge recommendation: A follow-up discussion with the attending provider and/or case management is planned IF patient discharges home will need the following DME: to be determined (TBD) SUBJECTIVE:  
Patient stated . OBJECTIVE DATA SUMMARY:  
HISTORY:   
No past medical history on file. Past Surgical History:  
Procedure Laterality Date HX ORTHOPAEDIC    
 NEUROLOGICAL PROCEDURE UNLISTED Personal factors and/or comorbidities impacting plan of care: see above Home Situation Home Environment: Private residence Wheelchair Ramp: Yes One/Two Story Residence: One story Living Alone: Yes Support Systems: Family member(s) Patient Expects to be Discharged to[de-identified] Rehabilitation facility Current DME Used/Available at Home: Walker, rolling, Wheelchair EXAMINATION/PRESENTATION/DECISION MAKING:  
Vitals:  
 09/01/20 0704 09/01/20 0748 09/01/20 1108 09/01/20 1200 BP:  134/67  131/62 BP 1 Location:  Left arm  Right arm BP Patient Position:  At rest  At rest  
Pulse: 90 90 (!) 101 88 Resp:  19  20 Temp:  98.4 °F (36.9 °C)  97.5 °F (36.4 °C) SpO2:  96% 93% 97% Weight:      
Height:      
 
 
Critical Behavior: 
Neurologic State: Alert Orientation Level: Oriented to person, Oriented to place, Disoriented to time, Oriented to situation Cognition: Follows commands Hearing: Auditory Auditory Impairment: Hard of hearing, bilateral 
 
Range Of Motion: 
AROM: Grossly decreased, non-functional 
  
  
  
PROM: Grossly decreased, non-functional 
  
  
  
Strength:   
Strength: Grossly decreased, non-functional 
  
  
  
  
  
  
Tone & Sensation:  
  
  
  
  
  
  
  
  
  
   
Coordination: 
Coordination: Grossly decreased, non-functional 
Vision:  
  
Functional Mobility: 
Bed Mobility: 
Rolling: Moderate assistance;Assist x2; Additional time Supine to Sit: Moderate assistance;Assist x2; Additional time Sit to Supine: Maximum assistance;Assist x2; Additional time Transfers: 
Sit to Stand: Maximum assistance;Assist x2; Additional time Stand to Sit: Maximum assistance;Assist x2; Additional time Balance:  
Sitting: Impaired Sitting - Static: Fair (occasional) Sitting - Dynamic: Poor (constant support) Ambulation/Gait Training: 
  
  
  
  
  
  
  
 
Functional Measure: 
Barthel Index: 
 
Bathin Bladder: 0 Bowels: 5 Groomin Dressin Feedin Mobility: 0 Stairs: 0 Toilet Use: 0 Transfer (Bed to Chair and Back): 5 Total: 15/100 The Barthel ADL Index: Guidelines 1. The index should be used as a record of what a patient does, not as a record of what a patient could do. 2. The main aim is to establish degree of independence from any help, physical or verbal, however minor and for whatever reason. 3. The need for supervision renders the patient not independent. 4. A patient's performance should be established using the best available evidence. Asking the patient, friends/relatives and nurses are the usual sources, but direct observation and common sense are also important. However direct testing is not needed. 5. Usually the patient's performance over the preceding 24-48 hours is important, but occasionally longer periods will be relevant. 6. Middle categories imply that the patient supplies over 50 per cent of the effort. 7. Use of aids to be independent is allowed. Ryan Farmer., Barthel, D.W. (6146). Functional evaluation: the Barthel Index. [address] [facility] (14)2. REILLY Newton, Jose Multani., Kong Clay., [place], [address] [place] [address] Ave (1999). Measuring the change indisability after inpatient rehabilitation; comparison of the responsiveness of the Barthel Index and Functional [place] Measure. Journal of Neurology, Neurosurgery, and Psychiatry, 66(4), 742-947. SAM Becker, AUDREY Johnson, & April Tejeda M.A. (2004.) Assessment of post-stroke quality of life in cost-effectiveness studies: The usefulness of the Barthel Index and the EuroQoL-5D. [facility], 13, 695-55 Physical Therapy Evaluation Charge Determination History Examination Presentation Decision-Making HIGH Complexity :3+ comorbidities / personal factors will impact the outcome/ POC  HIGH Complexity : 4+ Standardized tests and measures addressing body structure, function, activity limitation and / or participation in recreation  HIGH Complexity : Unstable and unpredictable characteristics  Other outcome measures barthel index  HIGH Based on the above components, the patient evaluation is determined to be of the following complexity level: HIGH Pain Rating: 
Patient reported back pain prior to mobility, premedicated prior Activity Tolerance:  
Fair Please refer to the flowsheet for vital signs taken during this treatment. After treatment patient left in no apparent distress:  
Supine in bed, Call bell within reach, and Bed / chair alarm activated COMMUNICATION/EDUCATION:  
The patients plan of care was discussed with: Occupational therapist and Registered nurse. Fall prevention education was provided and the patient/caregiver indicated understanding., Patient/family have participated as able in goal setting and plan of care. , and Patient/family agree to work toward stated goals and plan of care. Thank you for this referral. 
Jassi Parada, PT, DPT Time Calculation: 31 mins

## 2020-09-01 NOTE — PROGRESS NOTES
Kiran Vega [facility] [place] [address] 
[address] [facility], [address] [place] [address] 
[phone] Medical Progress Note NAME:         Matheus Matta :        1933 MRM:        [id number] Date of service: 2020 Subjective: Patient has been seen and examined as a follow up for multiple medical issues. Chart, labs, diagnostics reviewed. Generalized weakness and less SOB. No chest pain or fever . Objective: 
 
Vital Signs: 
 
Visit Vitals /67 (BP 1 Location: Left arm, BP Patient Position: At rest) Pulse 90 Temp 98.4 °F (36.9 °C) Resp 19 Ht 5' 11\" (1.803 m) Wt 87.4 kg (192 lb 11.2 oz) SpO2 96% BMI 26.88 kg/m² Intake/Output Summary (Last 24 hours) at 2020 1025 Last data filed at 2020 0481 Gross per 24 hour Intake 800 ml Output 100 ml Net 700 ml Physical Examination: 
 
General:   Weak and ill looking male, not in any acute distress Eyes:   pink conjunctivae, PERRLA with no discharge. ENT:   no ottorrhea or rhinorrhea with dry mucous membranes Pulm:  decreased breath sounds with some crackles. No wheezes Card:  has regular and normal S1, S2 without thrills, bruits or peripheral edema Abd:  Soft, non-tender, + distended, normoactive bowel sounds Musc:  No cyanosis, clubbing, atrophy or deformities. Skin:  No rashes, bruising or ulcers. Neuro: Awake and alert. Generally a non focal exam.  
Psych:  Has a fair insight and is oriented x 3 Current Facility-Administered Medications Medication Dose Route Frequency  senna-docusate (PERICOLACE) 8.6-50 mg per tablet 1 Tab  1 Tab Oral QHS  enoxaparin (LOVENOX) injection 40 mg  40 mg SubCUTAneous Q24H  cefTRIAXone (ROCEPHIN) 2 g in sterile water (preservative free) 20 mL IV syringe  2 g IntraVENous DAILY  sodium chloride (NS) flush 5-40 mL  5-40 mL IntraVENous Q8H  
  sodium chloride (NS) flush 5-40 mL  5-40 mL IntraVENous PRN  
 acetaminophen (TYLENOL) tablet 650 mg  650 mg Oral Q6H PRN Or  
 acetaminophen (TYLENOL) suppository 650 mg  650 mg Rectal Q6H PRN  polyethylene glycol (MIRALAX) packet 17 g  17 g Oral DAILY PRN  promethazine (PHENERGAN) tablet 12.5 mg  12.5 mg Oral Q6H PRN Or  
 ondansetron (ZOFRAN) injection 4 mg  4 mg IntraVENous Q6H PRN  
 doxycycline (VIBRAMYCIN) 100 mg in 0.9% sodium chloride (MBP/ADV) 100 mL  100 mg IntraVENous Q12H  
 sodium chloride (NS) flush 5-10 mL  5-10 mL IntraVENous PRN Laboratory data and review: 
 
Recent Labs  
  09/01/20 
0326 08/30/20 
0444 WBC 18.9* 18.3* HGB 13.5 14.0  
HCT 40.9 41.6  160 Recent Labs 08/31/20 
8045 08/30/20 
2312  140  
K 3.9 3.4*  
 110* CO2 23 21 * 132* BUN 28* 30* CREA 0.60* 0.68* CA 8.4* 8.4* No components found for: [place] [facility]: 
 
Telemetry reviewed by me:   normal sinus rhythm Assessment and Plan: 
 
Bilateral pneumonia (8/27/2020) / Sepsis POA: SARS-CoV 2 neg. Mycoplasma IgM and Legionella Ag neg. Continue IV Ceftriaxone, Doxycycline. Oxygen as needed. Ambulate as tolerated. CM working on discharge planning 
  
UTI (urinary tract infection) (8/28/2020) POA: apparently recently treated for a UTi. Blood Cx isolated E coli and urine cx grew E coli and proteus species. Continue IV Ceftriaxone. Surveillance blood cultures neg Renal mass, right (8/28/2020) POA: he also has lytic lesions in the lumbar spine concerning for metastasis. CT with contrast concerning for RCC. Seen by oncology and urology. 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 Supportive care. PT, OT as tolerated  
  
Dyslipidemia (8/28/2020) POA: hold [place] Liver hemangioma (8/31/2020) POA: CT abdomen confirms a benign hemangioma. No further testing Total time spent for the patient's care: 30  Minutes Care Plan discussed with: Patient, Nursing Staff and consulting physicians Discussed:  Care Plan and D/C Planning Prophylaxis:  SCD's Anticipated Disposition:  Home w/Family  
 
 
 
        
___________________________________________________ Attending Physician:   Darryl Tovar MD

## 2020-09-01 NOTE — PROGRESS NOTES
Problem: Self Care Deficits Care Plan (Adult) Goal: *Acute Goals and Plan of Care (Insert Text) Description: FUNCTIONAL STATUS PRIOR TO ADMISSION: The patient was functional at the wheelchair level and was supervision for transfers to the chair. HOME SUPPORT PRIOR TO ADMISSION: The patient lived alone with no local support. Occupational Therapy Goals Initiated 9/1/2020 1. Patient will perform grooming with modified independence within 7 day(s). 2.  Patient will perform upper body dressing and bathing with supervision/set-up within 7 day(s). 3.  Patient will perform lower body dressing and bathing with moderate assistance within 7 day(s). 4.  Patient will perform toilet transfers to [facility] with maximal assistance within 7 day(s). 5.  Patient will perform all aspects of toileting with moderate assistance  within 7 day(s). 6.  Patient will participate in upper extremity therapeutic exercise/activities with supervision/set-up for 10 minutes within 7 day(s). 7.  Patient will utilize energy conservation techniques during functional activities with verbal cues within 7 day(s). Outcome: Progressing Towards Goal 
 OCCUPATIONAL THERAPY EVALUATION Patient: Melissa Sanchez (30 y.o. male) Date: 9/1/2020 Primary Diagnosis: Bilateral pneumonia [J18.9] Precautions:  Fall ASSESSMENT Based on the objective data described below, the patient presents with decreased activity tolerance, generalized weakness, impaired balance, and impaired B UE coordination following admission for B PNA. Patient reports a decline in function for several months, was functional at wheelchair level, but reports a history of frequent falls. Patient had a fall just prior to this admission and c/o back pain today but unsure if this is chronic or acute. Patient able to answer questions and follow commands but inconstancies noted with information provided.   He also admits to increased anxiety since admission which worsens with activity d/t fear of falling. Patient performed transfers with x2 person assist and education for improved technique. Patient unable to progress functional transfers to safely remain OOB therefore returned to supine and bed placed in modified chair position. Patient engaged in ADLs with fair tolerance; Increased assistance needed for LB and standing ADLs. Patient received on 2.5 L O2, weaned to room air with SpO2 remaining >92% with all activity. Patient would benefit from continued skilled OT to progress towards goals and improve overall independence. Current Level of Function Impacting Discharge (ADLs/self-care): Patient required mod to max A x2 for bed mobility and max A x2 for OOB transfers. Patient required setup to mod A for UB ADLs and max to total A for LB ADLs. Functional Outcome Measure: The patient scored Total: 15/100 on the Barthel Index outcome measure. Patient will benefit from skilled therapy intervention to address the above noted impairments. PLAN : 
Recommendations and Planned Interventions: self care training, functional mobility training, therapeutic exercise, balance training, therapeutic activities, endurance activities, patient education, home safety training, and family training/education Frequency/Duration: Patient will be followed by occupational therapy 5 times a week to address goals. Recommendation for discharge: (in order for the patient to meet his/her long term goals) Therapy up to 5 days/week in SNF setting This discharge recommendation: 
Has been made in collaboration with the attending provider and/or case management IF patient discharges home will need the following DME: none SUBJECTIVE:  
Patient stated Every since I fell I've been this way.  re: fear of falling (anxiety) increasing with activity OBJECTIVE DATA SUMMARY:  
HISTORY:  
No past medical history on file. Past Surgical History:  
Procedure Laterality Date HX ORTHOPAEDIC    
 NEUROLOGICAL PROCEDURE UNLISTED Expanded or extensive additional review of patient history:  
 
Home Situation Home Environment: Private residence Wheelchair Ramp: Yes One/Two Story Residence: One story Living Alone: Yes Support Systems: Family member(s) Patient Expects to be Discharged to[de-identified] Rehabilitation facility Current DME Used/Available at Home: Walker, rolling, Wheelchair Hand dominance: Right EXAMINATION OF PERFORMANCE DEFICITS: 
Cognitive/Behavioral Status: 
Neurologic State: Alert Orientation Level: Oriented to person;Oriented to place Cognition: Follows commands Perception: Appears intact Perseveration: No perseveration noted Safety/Judgement: Awareness of environment Skin: Intact in the uppers Edema: None noted in the uppers Hearing: Auditory Auditory Impairment: Hard of hearing, bilateral 
 
Vision/Perceptual:   
Tracking: Able to track stimulus in all quadrants w/o difficulty Diplopia: No   
Acuity: Within Defined Limits Range of Motion: WDL in the uppers Strength: 
Decreased but functional in the uppers Coordination: 
Fine Motor Skills-Upper: Left Impaired;Right Impaired Gross Motor Skills-Upper: Left Impaired;Right Impaired Tone & Sensation: 
Tone: normal 
Sensation: intact Balance: 
Sitting: Impaired Sitting - Static: Fair (occasional) Sitting - Dynamic: Poor (constant support) Standing: Impaired Standing - Static: Poor Functional Mobility and Transfers for ADLs: 
Bed Mobility: 
Rolling: Moderate assistance;Assist x2; Additional time Supine to Sit: Moderate assistance;Assist x2; Additional time Sit to Supine: Maximum assistance;Assist x2; Additional time Scooting: Maximum assistance;Assist x2; Additional time Transfers: 
Sit to Stand: Maximum assistance;Assist x2; Additional time Stand to Sit: Maximum assistance;Assist x2; Additional time ADL Assessment: Feeding: Setup Oral Facial Hygiene/Grooming: Minimum assistance Bathing: Maximum assistance Upper Body Dressing: Moderate assistance Lower Body Dressing: Total assistance Toileting: Total assistance Cognitive Retraining Safety/Judgement: Awareness of environment Functional Measure: 
Barthel Index: 
 
Bathin Bladder: 0 Bowels: 5 Groomin Dressin Feedin Mobility: 0 Stairs: 0 Toilet Use: 0 Transfer (Bed to Chair and Back): 5 Total: 15/100 The Barthel ADL Index: Guidelines 1. The index should be used as a record of what a patient does, not as a record of what a patient could do. 2. The main aim is to establish degree of independence from any help, physical or verbal, however minor and for whatever reason. 3. The need for supervision renders the patient not independent. 4. A patient's performance should be established using the best available evidence. Asking the patient, friends/relatives and nurses are the usual sources, but direct observation and common sense are also important. However direct testing is not needed. 5. Usually the patient's performance over the preceding 24-48 hours is important, but occasionally longer periods will be relevant. 6. Middle categories imply that the patient supplies over 50 per cent of the effort. 7. Use of aids to be independent is allowed. Kimberly Jenkins., Barthel, D.W. (2970). Functional evaluation: the Barthel Index. [facility] (14)2. REILLY Snider, Amilcar Hein., Elissa Ng., [place], [address] [place] [address] (). Measuring the change indisability after inpatient rehabilitation; comparison of the responsiveness of the Barthel Index and Functional Randall Measure. Journal of Neurology, Neurosurgery, and Psychiatry, 66(4), 127-241.  
Klaudia Mera, N.J.A, RICCO Johnson.MANDA, & Cesia Rodriguez, M.A. (2004.) Assessment of post-stroke quality of life in cost-effectiveness studies: The usefulness of the Barthel Index and the EuroQoL-5D. [facility], 13, 571-25 Occupational Therapy Evaluation Charge Determination History Examination Decision-Making LOW Complexity : Brief history review  LOW Complexity : 1-3 performance deficits relating to physical, cognitive , or psychosocial skils that result in activity limitations and / or participation restrictions  LOW Complexity : No comorbidities that affect functional and no verbal or physical assistance needed to complete eval tasks Based on the above components, the patient evaluation is determined to be of the following complexity level: LOW Activity Tolerance:  
Good Please refer to the flowsheet for vital signs taken during this treatment. After treatment patient left in no apparent distress:   
Sitting in chair, Call bell within reach, and patient. COMMUNICATION/EDUCATION:  
The patients plan of care was discussed with: Physical therapist, Registered nurse, and patient . Home safety education was provided and the patient/caregiver indicated understanding., Patient/family have participated as able in goal setting and plan of care. , and Patient/family agree to work toward stated goals and plan of care. This patients plan of care is appropriate for delegation to [facility]. Thank you for this referral. 
Henny Dillard, OTR/L Time Calculation: 39 mins

## 2020-09-01 NOTE — PROGRESS NOTES
Problem: Falls - Risk of 
Goal: *Absence of Falls Description: Document Augustus Quevedo Fall Risk and appropriate interventions in the flowsheet. Outcome: Progressing Towards Goal 
Note: Fall Risk Interventions: 
Mobility Interventions: Assess mobility with egress test, Bed/chair exit alarm, Communicate number of staff needed for ambulation/transfer, OT consult for ADLs, Patient to call before getting OOB, PT Consult for mobility concerns, PT Consult for assist device competence, Strengthening exercises (ROM-active/passive), Utilize walker, cane, or other assistive device, Utilize gait belt for transfers/ambulation Medication Interventions: Bed/chair exit alarm, Patient to call before getting OOB, Teach patient to arise slowly Elimination Interventions: Bed/chair exit alarm, Call light in reach, Patient to call for help with toileting needs, Toilet paper/wipes in reach, Toileting schedule/hourly rounds History of Falls Interventions: Bed/chair exit alarm, Door open when patient unattended, Investigate reason for fall, Room close to nurse's station, Utilize gait belt for transfer/ambulation, Assess for delayed presentation/identification of injury for 48 hrs (comment for end date), Vital signs minimum Q4HRs X 24 hrs (comment for end date) Problem: Patient Education: Go to Patient Education Activity Goal: Patient/Family Education Outcome: Progressing Towards Goal 
  
Problem: Pressure Injury - Risk of 
Goal: *Prevention of pressure injury Description: Document Compa Scale and appropriate interventions in the flowsheet. Outcome: Progressing Towards Goal 
Note: Pressure Injury Interventions: 
Sensory Interventions: Assess changes in LOC, Assess need for specialty bed, Chair cushion, Check visual cues for pain, Float heels, Maintain/enhance activity level, Minimize linen layers, Pressure redistribution bed/mattress (bed type), Turn and reposition approx.  every two hours (pillows and wedges if needed) Moisture Interventions: Absorbent underpads, Apply protective barrier, creams and emollients, Assess need for specialty bed, Check for incontinence Q2 hours and as needed, Internal/External urinary devices, Limit adult briefs, Maintain skin hydration (lotion/cream), Minimize layers, Moisture barrier, Offer toileting Q_hr Activity Interventions: Assess need for specialty bed, Chair cushion, Increase time out of bed, Pressure redistribution bed/mattress(bed type), PT/OT evaluation Mobility Interventions: Assess need for specialty bed, Float heels, Pressure redistribution bed/mattress (bed type), PT/OT evaluation, Turn and reposition approx. every two hours(pillow and wedges) Nutrition Interventions: Document food/fluid/supplement intake, Discuss nutritional consult with provider, Offer support with meals,snacks and hydration Problem: Patient Education: Go to Patient Education Activity Goal: Patient/Family Education Outcome: Progressing Towards Goal 
  
Problem: Patient Education: Go to Patient Education Activity Goal: Patient/Family Education Outcome: Progressing Towards Goal 
  
Problem: Pneumonia: Discharge Outcomes Goal: *Demonstrates progressive activity Outcome: Progressing Towards Goal 
Goal: *Describes follow-up/return visits to physicians Outcome: Progressing Towards Goal 
Goal: *Tolerating diet Outcome: Progressing Towards Goal 
Goal: *Verbalizes name, dosage, time, side effects, and number of days to continue medications Outcome: Progressing Towards Goal 
Goal: *Influenza immunization Outcome: Progressing Towards Goal 
Goal: *Pneumococcal immunization Outcome: Progressing Towards Goal 
Goal: *Respiratory status at baseline Outcome: Progressing Towards Goal 
Goal: *Vital signs within defined limits Outcome: Progressing Towards Goal 
Goal: *Describes available resources and support systems Outcome: Progressing Towards Goal 
 Goal: *Optimal pain control at patient's stated goal 
Outcome: Progressing Towards Goal 
  
Problem: Patient Education: Go to Patient Education Activity Goal: Patient/Family Education Outcome: Progressing Towards Goal

## 2020-09-01 NOTE — PROGRESS NOTES
Orders received, chart reviewed and patient evaluated by physical therapy. Pending progression with skilled acute physical therapy, recommend: 
Therapy up to 5 days/week in SNF setting Recommend with nursing patient to complete as able in order to maintain strength, endurance and independence: Bed in chair position 3x per day, turn team. Thank you for your assistance. Full evaluation to follow.   
Yoli Keith PT,DPT,NCS

## 2020-09-01 NOTE — PROGRESS NOTES
Comprehensive Nutrition Assessment Type and Reason for Visit: Initial, RD nutrition re-screen/LOS Nutrition Recommendations/Plan: 1. Continue with Regular diet order. 2. Will order Ensure Enlive BID for additional 700 kcals, 40 gm protein/day. Nutrition Assessment:     
9/1: 81 yo male admitted for pneumonia. PMhx: Charcot-Yasmin-Tooth disease. Overweight per BMI of 26. No weight hx available in EMR. Attempted to speak with pt at bedside, pt sleeping soundly and did not wake to me calling his name. Unable to obtain nutrition or weight hx at this time. Regular diet ordered. PO intakes documented as 0-50% meals last few days. Will order ONS and follow up with pt's acceptance. Lunch tray in room, untouched. CT found pt with liver and renal mass which MD notes is suspicious for renal cell CA. Labs reviewed. Meds: Colace. BM noted 8/26. Malnutrition Assessment: 
Malnutrition Status:     Unable to assess at this time. Estimated Daily Nutrient Needs: 
Energy (kcal):  2043 kcals(REE 1571 x AF 1.3) Protein (g):  87-104gm(1-1.2gm/kg/d) Fluid (ml/day):  2043ml(1ml/kg) Nutrition Related Findings:  trace BLE edema Wounds:   
None Current Nutrition Therapies: DIET REGULAR Anthropometric Measures: 
· Height:  5' 11\" (180.3 cm) · Current Body Wt:  87.4 kg (192 lb 10.9 oz) · Admission Body Wt:      
· Usual Body Wt:       
· Ideal Body Wt:   :     
· Adjusted Body Weight:   ; Weight Adjustment for: No adjustment · Adjusted BMI:      
· BMI Category: Overweight (BMI 25.0-29. 9) Nutrition Diagnosis:  
· Inadequate energy intake related to inadequate protein-energy intake as evidenced by intake 0-25% Nutrition Interventions:  
Food and/or Nutrient Delivery: Continue current diet, Start oral nutrition supplement Nutrition Education and Counseling: No recommendations at this time Coordination of Nutrition Care: Continued inpatient monitoring Goals: Consume > 50% meals + ONS within next 3-4 days Nutrition Monitoring and Evaluation:  
Behavioral-Environmental Outcomes:   
Food/Nutrient Intake Outcomes: Food and nutrient intake, Supplement intake Physical Signs/Symptoms Outcomes: GI status, Weight Discharge Planning:   
Continue current diet, Continue oral nutrition supplement Electronically signed by Gerald Curry, [address] [place] [address] on 9/1/2020 at 2:28 PM 
 
Contact: [phone]

## 2020-09-01 NOTE — PROGRESS NOTES
Care Management follow up Patient admitted for bilateral pneumonia, fall, low back pain. Hx charcot foot with orthotics. Asbestosis. RUR score 13%/ low risk Current status Patient continues to require medical management post pneumonia. PT recommending SNF placement. Call placed to Valentine Moreland, patient's daughter, to discuss placement. She states her mother was at a facility in [place], [place] that she prefers but unable to remember name of facility. Agreed to call me back with name of facility. Transition of Care Plan 1. Monitor patient status and response to treatment. 2. Medical management with continued IV antibiotics. 3. Await call from patient's daughter to provide SNF choices. 4. CM to follow. UPDATE: 
Patients daughter returned call and states the facility she was referring to is [facility]. Discussed SNF level of care, chose [facility], referral placed via [employer]. Tatiana Johnson, RN, MSN/Care manager

## 2020-09-01 NOTE — PROGRESS NOTES
Bedside and Verbal shift change report given to Lorie Coy (oncoming nurse) by Ludivina Landry RN (offgoing nurse). Report included the following information SBAR, Kardex, Intake/Output, Accordion and Recent Results. SHIFT REPORT: 
 
 
 
 
Bedside and Verbal shift change report given to Gonz[address] (oncoming nurse) by Beto Guzman RN (offgoing nurse). Report included the following information SBAR and Kardex.

## 2020-09-01 NOTE — CONSULTS
New Urology Consult Note Patient: Lisette Booker MRN: [id number]  SSN: [id number] YOB: 1933  Age: 80 y.o. Sex: male Assessment:  
 
Lisette Booker is a 80 y.o. male who presented to the ED following fall at home, admitted for bilateral PNA. CT with liver mass, right renal lesion and lytic lesions in lumbar spine concerning for metastasis. Recommendations: 1. He is to have outpatient bone scan with oncology. Will arrange outpatient follow up in a couple of weeks. Thank you for this consult. Please contact [facility] with any further questions/concerns. Marylen Pata, NP [phone] History of Present Illness:  
 
Reason for Consult:  Renal cancer concerning for RCC with mets Lisette Booker is seen in consultation for reasons noted above at the request of Regina Diaz MD. This is a 80 y.o. male who presented to the ED following fall at home, admitted for bilateral PNA. CT with liver mass, right renal lesion and lytic lesions in lumbar spine concerning for metastasis. He denies any prior knowledge of renal lesion. No hematuria or other voiding complaints. CT with 1.4cm enhancing right renal mass, liver mass consistent with benign hemangioma, lytic lesions in the spine. Was being treated for UTI PTA, UA with 5-10rbcs. Subjective Past Medical History No past medical history on file. Past Surgical History:  
Past Surgical History:  
Procedure Laterality Date  HX ORTHOPAEDIC    
 NEUROLOGICAL PROCEDURE UNLISTED Medication: 
Current Facility-Administered Medications Medication Dose Route Frequency Provider Last Rate Last Dose  senna-docusate (PERICOLACE) 8.6-50 mg per tablet 1 Tab  1 Tab Oral QHS Schoeneweis, Ann, NP   1 Tab at 08/31/20 2136  enoxaparin (LOVENOX) injection 40 mg  40 mg SubCUTAneous Q24H Regina Diaz MD      
  cefTRIAXone (ROCEPHIN) 2 g in sterile water (preservative free) 20 mL IV syringe  2 g IntraVENous DAILY Cristhian Kim MD   2 g at 08/31/20 4166  sodium chloride (NS) flush 5-40 mL  5-40 mL IntraVENous Q8H Cristhian Kim MD   10 mL at 09/01/20 0517  
 sodium chloride (NS) flush 5-40 mL  5-40 mL IntraVENous PRN Cristhian Kim MD      
 acetaminophen (TYLENOL) tablet 650 mg  650 mg Oral Q6H PRN Cristhian Kim MD   650 mg at 08/31/20 1939 Or  acetaminophen (TYLENOL) suppository 650 mg  650 mg Rectal Q6H PRN Cristhian Kim MD      
 polyethylene glycol (MIRALAX) packet 17 g  17 g Oral DAILY PRN Cristhian Kim MD      
 promethazine (PHENERGAN) tablet 12.5 mg  12.5 mg Oral Q6H PRN Cristhian Kim MD      
 Or  
 ondansetron [facility]) injection 4 mg  4 mg IntraVENous Q6H PRN Cristhian Kim MD      
 doxycycline (VIBRAMYCIN) 100 mg in 0.9% sodium chloride (MBP/ADV) 100 mL  100 mg IntraVENous Q12H Cristhian Kim  mL/hr at 08/31/20 2136 100 mg at 08/31/20 2136  sodium chloride (NS) flush 5-10 mL  5-10 mL IntraVENous PRN Cristhian Kim MD   10 mL at 08/31/20 0382 Allergies: 
No Known Allergies Social History: 
Social History Tobacco Use  Smoking status: Never Smoker  Smokeless tobacco: Never Used Substance Use Topics  Alcohol use: Not Currently  Drug use: Not on file Family History Family History Problem Relation Age of Onset  Heart Disease Mother  Heart Disease Father  Parkinsonism Paternal Uncle Review of Systems ROS from attending provider note from 8/31/2020 reviewed and changes (other than per HPI) include : none. Objective:  
 
Vital signs in last 24 hours: 
Visit Vitals /67 (BP 1 Location: Left arm, BP Patient Position: At rest) Pulse 90 Temp 98.4 °F (36.9 °C) Resp 19 Ht 5' 11\" (1.803 m) Wt 87.4 kg (192 lb 11.2 oz) SpO2 96% BMI 26.88 kg/m² Intake/Output last 3 shifts: Date 08/31/20 0700 - 09/01/20 8955 09/01/20 0700 - 09/02/20 2026 Shift 0700-1859 1900-0659 24 Hour Total 0700-1859 1900-0659 24 Hour Total  
INTAKE  
P.O. 200 500 700     
  P. O. 200 500 700     
I. V.(mL/kg/hr)  100(0.1) 100(0) Volume (doxycycline (VIBRAMYCIN) 100 mg in 0.9% sodium chloride (MBP/ADV) 100 mL)  100 100 Volume (cefTRIAXone (ROCEPHIN) 2 g in sterile water (preservative free) 20 mL IV syringe)  0 0 Shift Total(mL/kg) 200(2.3) 600(6.9) 800(9.2) OUTPUT Urine(mL/kg/hr)  100(0.1) 100(0) Urine Voided  100 100 Urine Occurrence(s) 2 x 1 x 3 x Shift Total(mL/kg)  100(1.1) 100(1.1)  500 700 Weight (kg) 87.9 87.4 87.4 87.4 87.4 87.4 Physical Exam 
General: NAD HEENT: atraumatic Pulmonary: Normal work of breathing Abdomen: soft, NTTP, nondistended, no suprapubic fullness or tenderness : no CVA tenderness Gait: not observed Neuro: Appropriate, no focal neurological deficits Mood/Affect: normal 
 
Lab/Imaging Review: Most Recent Labs: 
Lab Results Component Value Date/Time WBC 18.9 (H) 09/01/2020 03:26 AM  
 HGB 13.5 09/01/2020 03:26 AM  
 HCT 40.9 09/01/2020 03:26 AM  
 PLATELET 167 97/18/4082 03:26 AM  
 MCV 93.2 09/01/2020 03:26 AM  
  
 
Lab Results Component Value Date/Time Sodium 139 08/31/2020 03:28 AM  
 Potassium 3.9 08/31/2020 03:28 AM  
 Chloride 107 08/31/2020 03:28 AM  
 CO2 23 08/31/2020 03:28 AM  
 Anion gap 9 08/31/2020 03:28 AM  
 Glucose 123 (H) 08/31/2020 03:28 AM  
 BUN 28 (H) 08/31/2020 03:28 AM  
 Creatinine 0.60 (L) 08/31/2020 03:28 AM  
 BUN/Creatinine ratio 47 (H) 08/31/2020 03:28 AM  
 GFR est AA >60 08/31/2020 03:28 AM  
 GFR est non-AA >60 08/31/2020 03:28 AM  
 Calcium 8.4 (L) 08/31/2020 03:28 AM  
 Bilirubin, total 1.8 (H) 08/29/2020 12:39 AM  
 Alk.  phosphatase 137 (H) 08/29/2020 12:39 AM  
 Protein, total 7.1 08/29/2020 12:39 AM  
 Albumin 2.3 (L) 08/29/2020 12:39 AM  
 Globulin 4.8 (H) 08/29/2020 12:39 AM  
 A-G Ratio 0.5 (L) 08/29/2020 12:39 AM  
 ALT (SGPT) 59 08/29/2020 12:39 AM  
  
 
No results found for: PSA, Kenyatta Lynn, PSAR3, [id number], [id number], PSALT, 81332, PSAEXT COAGS:  No results found for: APTT, PTP, INR, INREXT No results found for: HBA1C, HGBE8, OCJ0SBOG, SSU3GJVA Lab Results Component Value Date/Time CK 73 08/27/2020 06:57 PM  
 Troponin-I, Qt. <0.05 08/27/2020 06:59 PM  
  
 
 
Urine/Blood Cultures: 
Results Procedure Component Value Units Date/Time CULTURE, BLOOD [[id number]] Collected:  08/31/20 0329 Order Status:  Completed Specimen:  Blood Updated:  09/01/20 0544 Special Requests: NO SPECIAL REQUESTS Culture result: NO GROWTH 1 DAY     
 CULTURE, BLOOD [[id number]] Collected:  08/31/20 0328 Order Status:  Completed Specimen:  Blood Updated:  09/01/20 0544 Special Requests: NO SPECIAL REQUESTS Culture result: NO GROWTH 1 DAY     
 CULTURE, RESPIRATORY/SPUTUM/BRONCH Jeralene Rad [[id number]] Collected:  08/29/20 0919 Order Status:  Completed Specimen:  Sputum Updated:  08/31/20 1019 Special Requests: NO SPECIAL REQUESTS     
  GRAM STAIN    
  OCCASIONAL EPITHELIAL CELLS SEEN  
     
   FEW WBCS SEEN     
   NO ORGANISMS SEEN Culture result:    
  LIGHT NORMAL RESPIRATORY MARK MYCOPLASMA AB, IGM [[id number]] Collected:  08/29/20 0039 Order Status:  Completed Specimen:  Serum Updated:  08/29/20 1124 Mycoplasma Ab, IgM NONREACTIVE     
 LEGIONELLA PNEUMOPHILA AG, URINE [[id number]] Collected:  08/28/20 2320 Order Status:  Completed Specimen:  Urine Updated:  08/30/20 1336 Source URINE     
  L pneumophila S1 Ag, urine Negative Comment: (NOTE) Presumptive negative for L. pneumophila serogroup 1 antigen in urine, 
suggesting no recent or current infection. Legionnaires' disease 
cannot be ruled out since other serogroups and species may also cause disease. Performed At: [address] [id number] Darrick Bloom MD [id number] S. Shellie Severance, UR/CSF [[id number]] Collected:  08/28/20 2320 Order Status:  Completed Specimen:  Miscellaneous sample Updated:  08/30/20 1336 Source URINE Specimen Urine Streptococcus pneumoniae Ag Negative Fluid culture Not indicated. Organism ID Not indicated. Please note Comment Comment: (NOTE) College of American Pathologists standards require a culture to be 
performed on CSF specimens submitted for bacterial antigen testing. (CAP [id number]) Urine specimens will not be cultured. Performed At: [address] [id number] Darrick Bloom MD [id number] RESPIRATORY PANEL,PCR,NASOPHARYNGEAL [[id number]] Collected:  08/28/20 1829 Order Status:  Completed Specimen:  Nasopharyngeal Updated:  08/29/20 0002 Adenovirus Not detected Coronavirus 229E Not detected Coronavirus HKU1 Not detected Coronavirus CVNL63 Not detected Coronavirus OC43 Not detected Metapneumovirus Not detected Rhinovirus and Enterovirus Not detected Influenza A Not detected Influenza A, subtype H1 Not detected Influenza A, subtype H3 Not detected INFLUENZA A H1N1 PCR Not detected Influenza B Not detected Parainfluenza 1 Not detected Parainfluenza 2 Not detected Parainfluenza 3 Not detected Parainfluenza virus 4 Not detected RSV by PCR Not detected B. parapertussis, PCR Not detected Bordetella pertussis - PCR Not detected Chlamydophila pneumoniae DNA, QL, PCR Not detected Mycoplasma pneumoniae DNA, QL, PCR Not detected CULTURE, RESPIRATORY/SPUTUM/BRONCH Shinto Carine STAIN [[id number]] Order Status:  Canceled Specimen:  Sputum URINE CULTURE HOLD SAMPLE [[id number]] Collected:  08/27/20 2000 Order Status:  Completed Specimen:  Urine from Serum Updated:  08/27/20 2012 Urine culture hold Urine on hold in Microbiology dept for 2 days. If unpreserved urine is submitted, it cannot be used for addtional testing after 24 hours, recollection will be required. CULTURE, URINE [[id number]]  (Abnormal)  (Susceptibility) Collected:  08/27/20 2000 Order Status:  Completed Specimen:  Urine from Clean catch Updated:  08/30/20 6648 Special Requests: NO SPECIAL REQUESTS Campbell Count --     
  >100,000 COLONIES/mL Culture result: ESCHERICHIA COLI     
   PROTEUS MIRABILIS Susceptibility Escherichia coli MICHELLE Amikacin ($) Susceptible Ampicillin ($) Susceptible Ampicillin/sulbactam ($) Susceptible Cefazolin ($) Susceptible Cefepime ($$) Susceptible Cefoxitin Susceptible Ceftazidime ($) Susceptible Ceftriaxone ($) Susceptible Ciprofloxacin ($) Susceptible Gentamicin ($) Susceptible Levofloxacin ($) Susceptible Meropenem ($$) Susceptible Nitrofurantoin Susceptible Piperacillin/Tazobac ($) Susceptible Tobramycin ($) Susceptible Trimeth/Sulfa Susceptible Susceptibility Proteus mirabilis MICHELLE Amikacin ($) Susceptible Ampicillin ($) Susceptible Ampicillin/sulbactam ($) Susceptible Cefazolin ($) Susceptible Cefepime ($$) Susceptible Cefoxitin Susceptible Ceftazidime ($) Susceptible Ceftriaxone ($) Susceptible Ciprofloxacin ($) Susceptible Gentamicin ($) Susceptible Levofloxacin ($) Susceptible Meropenem ($$) Susceptible Nitrofurantoin Resistant Piperacillin/Tazobac ($) Susceptible Tobramycin ($) Susceptible Trimeth/Sulfa Susceptible CULTURE, BLOOD [[id number]]  (Abnormal)  (Susceptibility) Collected:  08/27/20 1922 Order Status:  Completed Specimen:  Blood Updated:  08/30/20 2805 Special Requests: NO SPECIAL REQUESTS Culture result:    
  ESCHERICHIA COLI GROWING IN BOTH BOTTLES DRAWN (SITES = R AC) Susceptibility Escherichia coli MICHELLE Amikacin ($) Susceptible Ampicillin ($) Susceptible Ampicillin/sulbactam ($) Susceptible Cefazolin ($) Susceptible Cefepime ($$) Susceptible Cefoxitin Susceptible Ceftazidime ($) Susceptible Ceftriaxone ($) Susceptible Ciprofloxacin ($) Susceptible Gentamicin ($) Susceptible Levofloxacin ($) Susceptible Meropenem ($$) Susceptible Piperacillin/Tazobac ($) Susceptible Tobramycin ($) Susceptible Trimeth/Sulfa Susceptible MICRO TRACKING [[id number]] Collected:  08/27/20 1922 Order Status:  Completed Updated:  08/28/20 6992 MICRO TRACKING [[id number]] Collected:  08/27/20 1922 Order Status:  Completed Updated:  08/28/20 5807 CULTURE, BLOOD [[id number]]  (Abnormal) Collected:  08/27/20 1859 Order Status:  Completed Specimen:  Blood Updated:  08/30/20 7414 Special Requests: NO SPECIAL REQUESTS Culture result:    
  GRAM NEGATIVE RODS GROWING IN BOTH BOTTLES DRAWN (SITE = LFA) REFER TO [facility] [id number] FOR ID AND SENSITIVITIES Rocio Lehman [[id number]] Collected:  08/27/20 1859 Order Status:  Completed Updated:  08/28/20 0540 Rocio Lehman [[id number]] Collected:  08/27/20 1859 Order Status:  Completed Updated:  08/28/20 8656 IMAGING: 
Ct Abd W Wo Cont Result Date: 8/31/2020 EXAM: CT ABD W WO CONT INDICATION: eval liver and renal mass; 3 phase liver COMPARISON: August 27, 2020. CONTRAST: 100 mL of Isovue-370. TECHNIQUE:  Multislice helical CT was performed from the diaphragm to the iliac crest prior to and during rapid bolus intravenous contrast administration.  Post contrast imaging was performed in hepatic arterial, portal venous, and equilibrium phases. Oral contrast was not administered. Contiguous 5 mm axial images were reconstructed and lung and soft tissue windows were generated. Coronal and sagittal reformations were generated. CT dose reduction was achieved through use of a standardized protocol tailored for this examination and automatic exposure control for dose modulation. FINDINGS: LOWER THORAX: Bilateral lower lobe dependent consolidation with tiny bilateral pleural effusions. LIVER: 4.4 x 3.2 cm mass in hepatic segment 4A imaged rates peripheral nodular enhancement and centripetal fill in on delayed imaging. A 7 mm hypodensity is seen in segment 5. BILIARY TREE: Numerous tiny calculi are seen in the gallbladder; there is no gallbladder wall thickening or pericholecystic fluid. CBD is not dilated. SPLEEN: within normal limits. PANCREAS: No mass or ductal dilatation. ADRENALS: Unremarkable. KIDNEYS: 1.4 cm mass along the lateral margin of the interpolar region of the right kidney demonstrates peripheral enhancement. STOMACH: Small gastric diverticulum. SMALL BOWEL: No dilatation or wall thickening. COLON: No dilatation or wall thickening. PERITONEUM: No ascites or pneumoperitoneum. RETROPERITONEUM: No lymphadenopathy. Atherosclerotic, mildly ectatic abdominal aorta, measuring 3.1 cm in diameter. BONES: Degenerative changes in the thoracolumbar spine. Unchanged lucent bone lesions at L1 and L3. ABDOMINAL WALL: Small fat-containing ventral hernia. ADDITIONAL COMMENTS: N/A IMPRESSION: 1. 4.4 cm mass in hepatic segment 4A is compatible with a benign hemangioma. 2. 1.4 cm right renal mass demonstrates peripheral enhancement and is suspicious for renal cell carcinoma. 3. 7 mm hypodensity in hepatic segment 5 is too small to characterize. 4. Cholecystolithiasis, with no CT evidence of acute cholecystitis. 5. Bibasilar dependent airspace disease and tiny bilateral pleural effusions. 6. Additional incidental findings as detailed above. Signed By: Shanda Baker NP  - September 1, 2020

## 2020-09-01 NOTE — PROGRESS NOTES
Bedside and Verbal shift change report given to [facility]  (oncoming nurse) by Kelsea William (offgoing nurse). Report included the following information SBAR, Kardex, Intake/Output, MAR, Accordion, Recent Results and Cardiac Rhythm NSR. Bedside and Verbal shift change report given to Michael Olson (oncoming nurse) by [facility] (offgoing nurse). Report included the following information SBAR, Kardex, Intake/Output, MAR, Accordion, Recent Results and Cardiac Rhythm NSR.

## 2020-09-02 NOTE — PROGRESS NOTES
Kiran Siliva pérez [place] [address] 
[address] [facility], [address] [place] [address] 
[phone] Medical Progress Note NAME:         Cj Fleming :        1933 MRM:        [id number] Date of service: 2020 Subjective: Patient has been seen and examined as a follow up for multiple medical issues. Chart, labs, diagnostics reviewed. Generalized weakness and less SOB. No chest pain or fever . Objective: 
 
Vital Signs: 
 
Visit Vitals /70 (BP 1 Location: Right arm, BP Patient Position: At rest) Pulse 91 Temp 97.7 °F (36.5 °C) Resp 22 Ht 5' 11\" (1.803 m) Wt 88.5 kg (195 lb) SpO2 94% BMI 27.20 kg/m² Intake/Output Summary (Last 24 hours) at 2020 1644 Last data filed at 2020 1502 Gross per 24 hour Intake 630 ml Output 100 ml Net 530 ml Physical Examination: 
 
General:   Weak and ill looking male, not in any acute distress Eyes:   pink conjunctivae, PERRLA with no discharge. Pulm:  Decreased but clear breath sounds with some crackles. No wheezes Card:  has regular and normal S1, S2 without thrills, bruits. + peripheral edema Abd:  Soft, non-tender, + distended, normoactive bowel sounds Musc:  No cyanosis, clubbing, atrophy or deformities. Skin:  No rashes, bruising or ulcers. Neuro: Awake and alert. Generally a non focal exam.  
Psych:  Has a fair insight and is oriented x 3 Current Facility-Administered Medications Medication Dose Route Frequency  senna-docusate (PERICOLACE) 8.6-50 mg per tablet 1 Tab  1 Tab Oral QHS  enoxaparin (LOVENOX) injection 40 mg  40 mg SubCUTAneous Q24H  cefTRIAXone (ROCEPHIN) 2 g in sterile water (preservative free) 20 mL IV syringe  2 g IntraVENous DAILY  sodium chloride (NS) flush 5-40 mL  5-40 mL IntraVENous Q8H  
 sodium chloride (NS) flush 5-40 mL  5-40 mL IntraVENous PRN  
  acetaminophen (TYLENOL) tablet 650 mg  650 mg Oral Q6H PRN Or  
 acetaminophen (TYLENOL) suppository 650 mg  650 mg Rectal Q6H PRN  polyethylene glycol (MIRALAX) packet 17 g  17 g Oral DAILY PRN  promethazine (PHENERGAN) tablet 12.5 mg  12.5 mg Oral Q6H PRN Or  
 ondansetron (ZOFRAN) injection 4 mg  4 mg IntraVENous Q6H PRN  
 sodium chloride (NS) flush 5-10 mL  5-10 mL IntraVENous PRN Laboratory data and review: 
 
Recent Labs  
  09/01/20 
0326 WBC 18.9* HGB 13.5 HCT 40.9  Recent Labs 08/31/20 
7856   
K 3.9  CO2 23 * BUN 28* CREA 0.60* CA 8.4* No components found for: [facility]: 
 
Telemetry reviewed by me:   normal sinus rhythm Assessment and Plan: 
 
Bilateral pneumonia (8/27/2020) / Sepsis POA: SARS-CoV 2 neg. Mycoplasma IgM and Legionella Ag neg. Continue IV Ceftriaxone, Doxycycline. Now off oxygen. Ambulate as tolerated. Daughter still decidng on SNF of choice.  
  
UTI (urinary tract infection) (8/28/2020) POA: apparently recently treated for a UTi. Blood Cx isolated E coli and urine cx grew E coli and proteus species. Continue IV Ceftriaxone. Surveillance blood cultures remain neg Renal mass, right (8/28/2020) POA: he also has lytic lesions in the lumbar spine concerning for metastasis. CT with contrast concerning for RCC. Seen by oncology and urology. 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 Supportive care. PT, OT as tolerated. Placement pending 
  
Dyslipidemia (8/28/2020) POA: hold Mevacor Liver hemangioma (8/31/2020) POA: CT abdomen confirms a benign hemangioma. No further testing Total time spent for the patient's care: 25  Minutes Care Plan discussed with: Patient, Nursing Staff and consulting physicians Discussed:  Care Plan and D/C Planning Prophylaxis: enoxaparin Anticipated Disposition:  SNF 
 
 
 
        
___________________________________________________ Attending Physician:   Timi Washington MD

## 2020-09-02 NOTE — PROGRESS NOTES
Bedside and Verbal shift change report given to Trey Nava (oncoming nurse) by Gilmer Garcia (offgoing nurse). Report included the following information SBAR, Kardex, Intake/Output, MAR, Accordion and Recent Results. 1100 - wound care done by [facility] 1230 - Attempted to return call to daughter, unable to get a hold of her. TRANSFER - OUT REPORT: 
 
Verbal report given to Arbie Fothergill (name) on Carlos Eduardo Mccullough  being transferred to [facility] (unit) for routine progression of care Report consisted of patients Situation, Background, Assessment and  
Recommendations(SBAR). Information from the following report(s) SBAR, Kardex, Intake/Output, MAR, Accordion, Recent Results and Cardiac Rhythm NSR was reviewed with the receiving nurse. Lines:  
Peripheral IV 08/27/20 Left Forearm (Active) Site Assessment Clean, dry, & intact 09/02/20 1545 Phlebitis Assessment 0 09/02/20 1545 Infiltration Assessment 0 09/02/20 1545 Dressing Status Clean, dry, & intact 09/02/20 1545 Dressing Type Transparent 09/02/20 1545 Hub Color/Line Status Pink;End cap changed; Flushed;Patent 09/02/20 1545 Action Taken Open ports on tubing capped 09/02/20 1545 Alcohol Cap Used Yes 09/02/20 1545 Opportunity for questions and clarification was provided. Patient transported with: 
 Registered Nurse Tech

## 2020-09-02 NOTE — PROGRESS NOTES
Bedside and Verbal shift change report given to Lorie Coy (oncoming nurse) by Jagruti Dsozua (offgoing nurse). Report included the following information SBAR, Kardex, Intake/Output, Accordion and Recent Results. SHIFT REPORT: 
 
 
 
 
 
Bedside and Verbal shift change report given to ---- (oncoming nurse) by Je Rivera RN  (offgoing nurse). Report included the following information SBAR, Kardex, Intake/Output, Accordion and Recent Results.

## 2020-09-02 NOTE — PROGRESS NOTES
Physical Therapy Attempted to see patient, patient declined due to fatigue and pain. Stating \"I can't do it right now\". WE will continue to follow and re-attempt later as able. Thank you.  
Steen Hatchet PT,DPT,NCS

## 2020-09-02 NOTE — PROGRESS NOTES
Urology Progress Note Patient: Rebeca Cifuentes MRN: [id number]  SSN: [id number] YOB: 1933  Age: 80 y.o. Sex: male Assessment:  
 
Rebeca Cifuentes is a 80 y.o. male who presented to the ED following fall at home, admitted for bilateral PNA. CT with liver mass, right renal lesion and lytic lesions in lumbar spine concerning for metastasis. Plan: 1. PSA results pending. 2. He has outpatient follow up arranged with Dr. Woody Gould on 9/15/2020. Subjective: No  complaints this morning. PSA added to morning labs, pending. Objective:  
 
Visit Vitals /72 (BP 1 Location: Right arm, BP Patient Position: At rest) Pulse 96 Temp 98.5 °F (36.9 °C) Resp 24 Ht 5' 11\" (1.803 m) Wt 88.5 kg (195 lb) SpO2 95% BMI 27.20 kg/m² Intake/Output Summary (Last 24 hours) at 9/2/2020 8542 Last data filed at 9/2/2020 0021 Gross per 24 hour Intake 530 ml Output 550 ml Net -20 ml Physical Exam 
General: NAD HEENT: atraumatic Respiratory: no distress Neuro: Appropriate, no focal neurological deficits Mood/Affect: normal 
 
No results found for this or any previous visit (from the past 24 hour(s)). Signed By: Kathy Aleman NP - September 2, 2020

## 2020-09-02 NOTE — WOUND CARE
Wound care consult: 
Initial visit for skin and wound assessment Alert no distress- assessed with staff. Skin thin and fragile with edema. Assessment All skin folds and bony prominences assessed, turned with staff assistance. Incontinent of urine, inner groins pink and moist. Heels and elbows intact- off loading boots and SCDS in use. Left upper back- full thickness skin loss, less than 3cm - sustained in fall prior to admission. Left buttock- area of redness over the fatty tissue with small area of partial thickness skin loss, unable to determine etiology- moisture vs friction. Treatment Incontinent care given with 
Repositioned in bed Heels floated- placed in boots Foam to back and buttock Recommendations/Plan Wound and skin care recommendations ordered Turn, reposition every 2 hours as tolerated, float heels, off loading boots Incontinent care --- Apply Zinc to all open areas, moisture barrier as needed. Will follow, reconsult as needed.  
 
[address] [facility]

## 2020-09-02 NOTE — PROGRESS NOTES
CM follow up: 
 
Spoke with patient's daughter Lawana Buerger [phone]. She states she is talking to [facility] today and wants to make a decision about SNF placement afterwards. Patient has been accepted by [facility]. Await patient's daughters decision. Annika Bronson RN, MSN/Care manager [phone]

## 2020-09-02 NOTE — PROGRESS NOTES
4:29 PM 
Spoke with patient's daughter who was going to look into [facility] but when she called them they said they do not take [employer]. She is interested in [facility] as well and they do take [facility] and she agreed to have the medical information sent to [place]. Sent through [employer]. Daughter knows that she needs a decision between [facility] and [facility] and call Luiz Mccullough (assigned cm) first thing tomorrow morning with her final decision so that facility can begin the auth.

## 2020-09-02 NOTE — PROGRESS NOTES
Bedside and Verbal shift change report given to [id number] Marcus Lu (oncoming nurse) by Humberto Moran (offgoing nurse). Report included the following information SBAR, ED Summary, Procedure Summary and Recent Results.

## 2020-09-03 NOTE — PROGRESS NOTES
Problem: Self Care Deficits Care Plan (Adult) Goal: *Acute Goals and Plan of Care (Insert Text) Description: FUNCTIONAL STATUS PRIOR TO ADMISSION: The patient was functional at the wheelchair level and was supervision for transfers to the chair. HOME SUPPORT PRIOR TO ADMISSION: The patient lived alone with no local support. Occupational Therapy Goals Initiated 9/1/2020 1. Patient will perform grooming with modified independence within 7 day(s). 2.  Patient will perform upper body dressing and bathing with supervision/set-up within 7 day(s). 3.  Patient will perform lower body dressing and bathing with moderate assistance within 7 day(s). 4.  Patient will perform toilet transfers to [facility] with maximal assistance within 7 day(s). 5.  Patient will perform all aspects of toileting with moderate assistance  within 7 day(s). 6.  Patient will participate in upper extremity therapeutic exercise/activities with supervision/set-up for 10 minutes within 7 day(s). 7.  Patient will utilize energy conservation techniques during functional activities with verbal cues within 7 day(s). Outcome: Progressing Towards Goal 
  
OCCUPATIONAL THERAPY TREATMENT Patient: Phil Vizcarra (49 y.o. male) Date: 9/3/2020 Diagnosis: Bilateral pneumonia [J18.9] Bilateral pneumonia Precautions: Fall Chart, occupational therapy assessment, plan of care, and goals were reviewed. ASSESSMENT Patient continues with skilled OT services and is slowly progressing towards goals. Patient is quick to state that he \"too weak\" to participate but is agreeable to attempt activity with education and encouragement. He is able to tolerate sitting EOB > 10 minutes for brief grooming and UE strengthening tasks. He requires manual facilitation and cues for form and pacing with all UE exercises and offers minimal participation.  He agrees to standing trial with max encouragement and requires max A x 2 to complete stand. He is returned to supine at end of session. Recommend SNF-level rehab at discharge. Current Level of Function Impacting Discharge (ADLs): max A x 2 for sit to stand; set up seated grooming tasks; max A LB dressing Other factors to consider for discharge: w/c level at baseline; mod I PLOF; lives alone PLAN : 
Patient continues to benefit from skilled intervention to address the above impairments. Continue treatment per established plan of care. to address goals. Recommend with staff: modified chair position 3x/day Recommend next OT session: continue with UE strengthening, ADLs, and transfers as able Recommendation for discharge: (in order for the patient to meet his/her long term goals) Therapy up to 5 days/week in SNF setting This discharge recommendation: 
Has not yet been discussed the attending provider and/or case management IF patient discharges home will need the following DME: TBD SUBJECTIVE:  
Patient stated I mean, we can try but it's not going to work.  OBJECTIVE DATA SUMMARY:  
Cognitive/Behavioral Status: 
Neurologic State: Alert Orientation Level: Oriented X4 Cognition: Follows commands Perception: Appears intact Perseveration: No perseveration noted Safety/Judgement: Awareness of environment; Fall prevention Functional Mobility and Transfers for ADLs: 
Bed Mobility: 
Supine to Sit: Moderate assistance;Assist x2; Additional time Sit to Supine: Moderate assistance;Assist x2 Transfers: 
Sit to Stand: Maximum assistance;Assist x2 Balance: 
Sitting: Intact; High guard Sitting - Static: Good (unsupported) Sitting - Dynamic: Good (unsupported) Standing: Impaired; With support Standing - Static: Constant support;Poor ADL Intervention: 
Grooming Grooming Assistance: Set-up Position Performed: Seated edge of bed Washing Face: Set-up Lower Body Dressing Assistance Socks: Total assistance (dependent)(to adjust socks) Position Performed: Seated edge of bed Cognitive Retraining Safety/Judgement: Awareness of environment; Fall prevention Therapeutic Exercises:  
Pt educated on and performed UE exercises while seated EOB in preparation for ADL transfers; pt with limited tolerance and requires increased cues and visual demonstration for form and pacing. Scapular elevation 1 set x 5 Scapular retraction 1 set x 5 Shoulder flexion 1 set x 5 Pain: 
Pt reporting minimal to moderate pain Activity Tolerance:  
Fair Please refer to the flowsheet for vital signs taken during this treatment. After treatment patient left in no apparent distress:  
Supine in bed, Heels elevated for pressure relief, Call bell within reach, Bed / chair alarm activated and Side rails x 3 
 
COMMUNICATION/COLLABORATION:  
The patients plan of care was discussed with: Physical therapist and Registered nurse. Estuardo Clifton OT Time Calculation: 28 mins

## 2020-09-03 NOTE — PROGRESS NOTES
Problem: Mobility Impaired (Adult and Pediatric) Goal: *Acute Goals and Plan of Care (Insert Text) Description: FUNCTIONAL STATUS PRIOR TO ADMISSION: The patient was functional at the wheelchair level and was supervision for transfers to the chair. HOME SUPPORT PRIOR TO ADMISSION: The patient lived alone with no local support. Physical Therapy Goals Initiated 9/1/2020 1. Patient will move from supine to sit and sit to supine  in bed with moderate assistance  within 7 day(s). 2.  Patient will transfer from bed to chair and chair to bed with moderate assistance  using the least restrictive device within 7 day(s). 3.  Patient will perform sit to stand with moderate assistance  within 7 day(s). 4.  Patient will ambulate with moderate assistance  for 15 feet with the least restrictive device within 7 day(s). Note: PHYSICAL THERAPY TREATMENT Patient: Steve Nixon (13 y.o. male) Date: 9/3/2020 Diagnosis: Bilateral pneumonia [J18.9] Bilateral pneumonia Precautions: Fall Chart, physical therapy assessment, plan of care and goals were reviewed. ASSESSMENT Patient continues with skilled PT services. Pt supine to sit with mod to max of 2. Pt progressed to CGA sitting on EOB. Pt performed knee extensions sitting with cues on EOB. Pt sit to stand with max assist of 2. Pt stood briefly with RW mod to max of 2. Pt returned to sit complaining of muh leg weakness. Pt sit to supine mod to max of 2. Pt progressing slowly. Continue goals. Current Level of Function Impacting Discharge (mobility/balance): Pt is mod to max of 2 for mobility. PLAN : 
Patient continues to benefit from skilled intervention to address the above impairments. Continue treatment per established plan of care. to address goals. Recommendation for discharge: (in order for the patient to meet his/her long term goals) Therapy up to 5 days/week in SNF setting This discharge recommendation: Has been made in collaboration with the attending provider and/or case management IF patient discharges home will need the following DME: to be determined (TBD) SUBJECTIVE:  
 
 
OBJECTIVE DATA SUMMARY:  
Critical Behavior: 
Neurologic State: Alert Orientation Level: Oriented X4 Cognition: Follows commands Safety/Judgement: Awareness of environment Functional Mobility Training: 
Bed Mobility: 
  
 Pt mod to max of 2 for bed mobility. Transfers: 
 Pt max assist of 2 for sit to stand. Balance: 
  
Ambulation/Gait Training: 
 Pt unable to ambulate at this time. Therapeutic Exercises:  
Knee extensions with cues. Activity Tolerance:  
Fair Please refer to the flowsheet for vital signs taken during this treatment. After treatment patient left in no apparent distress:  
Supine in bed COMMUNICATION/COLLABORATION:  
The patients plan of care was discussed with: Physical therapist.  
 
Aisha Skinner PTA Time Calculation: 23 mins

## 2020-09-03 NOTE — PROGRESS NOTES
TRANSFER - OUT REPORT: 
 
Verbal report given to [facility]) on  (patient name)  being transferred to [facility](unit) for routine progression of care Report consisted of patients Situation, Background, Assessment and  
Recommendations(SBAR). SNF referrals discussed. Patient has been accepted by [facility] for SNF but daughter Kong Rich has not approved that facility yet, she is evaluating [facility] today. Jama Woodard RN, MSN/Care manager [phone]

## 2020-09-03 NOTE — PROGRESS NOTES
HERB s/w pt's daughter, Tami Cottrell, to obtain final SNF choice. Preference is [address] HERB s/w Dao Oleary at [facility] and they have accepted pt pending authorization. Pt will need a current COVID test which has been ordered. Transition of Care Plan - RUR 10%: 1. Discharge to [address] and [address] once authorization obtained and current COVID results are back 2. [facility] to transport (pt placed on WILL CALL for 9/4/20) Tano Carrion LCSW

## 2020-09-03 NOTE — PROGRESS NOTES
Bedside and Verbal shift change report given to Jesus Cotter RN (oncoming nurse) by Marissa Faye RN (offgoing nurse). Report included the following information SBAR, Kardex and MAR.

## 2020-09-03 NOTE — PROGRESS NOTES
Bedside and Verbal shift change report given to JACK Bee (oncoming nurse) by Eduardo Luu (offgoing nurse). Report included the following information SBAR, Kardex, Procedure Summary, Intake/Output, MAR, Accordion, Recent Results and Med Rec Status.

## 2020-09-03 NOTE — PROGRESS NOTES
Kiran Pantojaelsen p[address] [place] [address] 
[address] 
[phone] Medical Progress Note NAME: You Client :  1933 MRM:  [id number] Date/Time: 9/3/2020 Assessment / Plan:  
 
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 Hospital course complicated by problems as listed below: 
 
Sepsis POA: found to have bilateral pneumonia, UTI, and bacteremia. Treat as below. Stable for DC pending SNF placement, requesting another SARS-CoV-2 PCR test prior to discharge PNA: SARS-CoV-2 PCR negative. Mycoplasma IgM and Legionella Ag neg. Treated with Zosyn, and later IV CTXC and doxy. UTI (urinary tract infection) / bacteremia: Blood Cx isolated E coli and urine cx grew E coli and Proteus species. Was on Zosyn, now on IV CTX. Surveillance blood cultures NGTD 
  
Renal mass, right: ?lytic lesions in the lumbar spine concerning for metastasis. CT with contrast worrisomefor RCC. Seen by oncology and urology, who recommend outpatient follow up for further testing 
  
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 Supportive care. PT/OT as tolerated. Placement pending as above 
  
Dyslipidemia: can resume statin at discharge 
  
Liver mass: CT triple phase compatible with a benign hemangioma. Outpatient follow up as needed Total time spent: 35 minutes Time spent in the care of this patient including reviewing records, discussing with nursing and/or other providers on the treatment team, obtaining history and examining the patient, and discussing treatment plans. Care Plan discussed with: Patient, Nursing Staff and >50% of time spent in counseling and coordination of care Discussed:  Care Plan and D/C Planning Prophylaxis:  Lovenox Disposition:  SNF/LTC Subjective: Chief Complaint:  Follow up bactermia Chart/notes/labs/studies reviewed, patient examined. Feels okay. Having some back pain. No fevers Objective:  
 
 
Vitals:  
 
  
Last 24hrs VS reviewed since prior progress note. Most recent are: 
 
Visit Vitals /66 (BP 1 Location: Right arm, BP Patient Position: At rest;Supine) Pulse 80 Temp 98 °F (36.7 °C) Resp 18 Ht 5' 11\" (1.803 m) Wt 88.8 kg (195 lb 12.3 oz) SpO2 96% BMI 27.30 kg/m² SpO2 Readings from Last 6 Encounters:  
09/03/20 96% 02/19/20 97% O2 Flow Rate (L/min): 3 l/min Intake/Output Summary (Last 24 hours) at 9/3/2020 1056 Last data filed at 9/2/2020 1827 Gross per 24 hour Intake 680 ml Output 300 ml Net 380 ml Exam:  
 
Physical Exam: 
 
Gen:  Elderly, frail, chronically ill-appearing. NAD HEENT:  Sclerae nonicteric, hearing intact to voice, mucous membranes moist 
Neck:  Supple, without masses. Resp:  No accessory muscle use, CTAB without wheezes, rales, or rhonchi 
Card: RRR, without m/r/g. No LE edema. Abd:  +bowel sounds, soft, NTTP, nondistended. Neuro: Face symmetric, tongue midline, speech fluent, follows commands appropriately Psych:  Alert, oriented x 3. Fair insight Medications Reviewed: (see below) Lab Data Reviewed: (see below) 
 
______________________________________________________________________ Medications:  
 
Current Facility-Administered Medications Medication Dose Route Frequency  senna-docusate (PERICOLACE) 8.6-50 mg per tablet 1 Tab  1 Tab Oral QHS  enoxaparin (LOVENOX) injection 40 mg  40 mg SubCUTAneous Q24H  cefTRIAXone (ROCEPHIN) 2 g in sterile water (preservative free) 20 mL IV syringe  2 g IntraVENous DAILY  sodium chloride (NS) flush 5-40 mL  5-40 mL IntraVENous Q8H  
 sodium chloride (NS) flush 5-40 mL  5-40 mL IntraVENous PRN  
 acetaminophen (TYLENOL) tablet 650 mg  650 mg Oral Q6H PRN  Or  
 acetaminophen (TYLENOL) suppository 650 mg  650 mg Rectal Q6H PRN  
  polyethylene glycol (MIRALAX) packet 17 g  17 g Oral DAILY PRN  promethazine (PHENERGAN) tablet 12.5 mg  12.5 mg Oral Q6H PRN Or  
 ondansetron (ZOFRAN) injection 4 mg  4 mg IntraVENous Q6H PRN  
 sodium chloride (NS) flush 5-10 mL  5-10 mL IntraVENous PRN Lab Review:  
 
Recent Labs  
  09/03/20 
0440 09/01/20 
0326 WBC 13.3* 18.9* HGB 12.8 13.5 HCT 38.0 40.9  266 No results for input(s): NA, K, CL, CO2, GLU, BUN, CREA, CA, MG, PHOS, ALB, TBIL, ALT, INR, INREXT in the last 72 hours. No lab exists for component: SGOT No components found for: [place] No results for input(s): PH, PCO2, PO2, HCO3, FIO2 in the last 72 hours. No results for input(s): INR, INREXT in the last 72 hours. No results found for: SDES Lab Results Component Value Date/Time Culture result: NO GROWTH 3 DAYS 08/31/2020 03:29 AM  
 Culture result: NO GROWTH 3 DAYS 08/31/2020 03:28 AM  
 Culture result: LIGHT NORMAL RESPIRATORY MARK 08/29/2020 09:19 AM  
 
        
___________________________________________________ Attending Physician: Tj Grant MD

## 2020-09-04 NOTE — PROGRESS NOTES
[facility] at [address], [address] [place] [address] W: [phone]  F: [phone] Reason for Visit:  
Mario Alvarado is a 80 y.o. male who isseen in consultation at the request of Dr. Nhan Castaneda for evaluation of Liver mass, right renal mass, lytic lumbar spine concerning for metastatic disease. History of Present Illness:  
Mr. Mario Alvarado is an 81 y/o male admitted 8/28/2020 with c/o fall, lower back pain, found to have liver mass, bone lesions. In ED, daughter reports pt fell 4 days ago. Pt is a poor historian. Hx of Charcot-Yasmin-Tooth disease. ED labs notable for WBC 17.1 , , T-bili 1.9. CT scan shows bilateral PNA; liver mass, possible R renal lesion and lytic lesions in the lumbar spine concerning for metastatic disease. He was admitted for further eval and management. Interval History:  
Feels overall well today. States he is being discharged to SNF. Has had repeat COVID testing which was negative. No family at bedside. No past medical history on file. Past Surgical History:  
Procedure Laterality Date  HX ORTHOPAEDIC    
 NEUROLOGICAL PROCEDURE UNLISTED Social History Tobacco Use  Smoking status: Never Smoker  Smokeless tobacco: Never Used Substance Use Topics  Alcohol use: Not Currently Family History Problem Relation Age of Onset  Heart Disease Mother  Heart Disease Father  Parkinsonism Paternal Uncle Current Facility-Administered Medications Medication Dose Route Frequency  senna-docusate (PERICOLACE) 8.6-50 mg per tablet 1 Tab  1 Tab Oral QHS  enoxaparin (LOVENOX) injection 40 mg  40 mg SubCUTAneous Q24H  cefTRIAXone (ROCEPHIN) 2 g in sterile water (preservative free) 20 mL IV syringe  2 g IntraVENous DAILY  sodium chloride (NS) flush 5-40 mL  5-40 mL IntraVENous Q8H  
 sodium chloride (NS) flush 5-40 mL  5-40 mL IntraVENous PRN  
  acetaminophen (TYLENOL) tablet 650 mg  650 mg Oral Q6H PRN Or  
 acetaminophen (TYLENOL) suppository 650 mg  650 mg Rectal Q6H PRN  polyethylene glycol (MIRALAX) packet 17 g  17 g Oral DAILY PRN  promethazine (PHENERGAN) tablet 12.5 mg  12.5 mg Oral Q6H PRN Or  
 ondansetron (ZOFRAN) injection 4 mg  4 mg IntraVENous Q6H PRN  
 sodium chloride (NS) flush 5-10 mL  5-10 mL IntraVENous PRN No Known Allergies Review of Systems: A complete review of systems was obtained, negative except as described above. Physical Exam:  
 
Visit Vitals /74 Pulse 88 Temp 97.4 °F (36.3 °C) Resp 18 Ht 5' 11\" (1.803 m) Wt 195 lb 12.3 oz (88.8 kg) SpO2 97% BMI 27.30 kg/m² General: elderly, No distress Eyes: Anicteric sclerae HENT: Atraumatic Neck: Supple Respiratory: exp wheezing noted; Normal respiratory effort; )2 in use. CV: 2+ bilateral LE edema noted GI: Soft, nontender, nondistended, no masses, no hepatomegaly, no splenomegaly Skin: No rashes, ecchymoses, or petechiae Psych: Alert, oriented, appropriate affect, fair judgment/insight Results:  
 
Lab Results Component Value Date/Time WBC 11.6 (H) 09/04/2020 05:19 AM  
 HGB 12.9 09/04/2020 05:19 AM  
 HCT 38.5 09/04/2020 05:19 AM  
 PLATELET 423 (H) 09/14/6310 05:19 AM  
 MCV 94.4 09/04/2020 05:19 AM  
 ABS. NEUTROPHILS 9.4 (H) 09/04/2020 05:19 AM  
 
Lab Results Component Value Date/Time Sodium 135 (L) 09/04/2020 05:19 AM  
 Potassium 3.8 09/04/2020 05:19 AM  
 Chloride 105 09/04/2020 05:19 AM  
 CO2 23 09/04/2020 05:19 AM  
 Glucose 102 (H) 09/04/2020 05:19 AM  
 BUN 23 (H) 09/04/2020 05:19 AM  
 Creatinine 0.52 (L) 09/04/2020 05:19 AM  
 GFR est AA >60 09/04/2020 05:19 AM  
 GFR est non-AA >60 09/04/2020 05:19 AM  
 Calcium 8.2 (L) 09/04/2020 05:19 AM  
 
Lab Results Component Value Date/Time  Bilirubin, total 1.2 (H) 09/04/2020 05:19 AM  
 ALT (SGPT) 61 09/04/2020 05:19 AM  
 Alk. phosphatase 118 (H) 09/04/2020 05:19 AM  
 Protein, total 6.2 (L) 09/04/2020 05:19 AM  
 Albumin 1.9 (L) 09/04/2020 05:19 AM  
 Globulin 4.3 (H) 09/04/2020 05:19 AM  
 
 
Lab Results Component Value Date/Time Lipase 22 (L) 08/27/2020 06:59 PM  
 
No results found for: INR, APTT, DDIMSQ, DDIME, [id number], Milderd Number, FDPLT, Karenann Bride, [address], [address], [address], [id number], [id number], [id number], [id number], [id number], [id number], Starla Chapmankathi Lab Results Component Value Date/Time  
 Prostate Specific Ag 4.6 (H) 09/02/2020 04:40 AM  
 
 
8/27/2020 XR CHEST IMPRESSION: 
1. Left-sided pleural effusion with associated passive atelectasis and/or 
consolidation. 8/27/2020 CT HEAD WO CONT IMPRESSION:  
 No acute intracranial abnormality on this noncontrast head CT. 
 
8/27/2020 CT CHEST ABD PELV W CONT IMPRESSION: 
  
1. Inflammation and gas in the left retroperitoneum around the left external 
iliac vasculature represent sequela of recent instrumentation versus nonspecific 
infection. No drainable abscess. 2. Bilateral lower lobe pneumonia more likely than atelectasis, greatest in the 
left lower lobe. 3. 4.3 cm enhancing mass in segment 3 of the liver cannot be fully characterized 
on this single phase study. 4. 1.5 cm right renal mass versus hemorrhagic cyst. 
5. Lytic lesions in the lumbar spine represent metastatic disease versus 
hemangioma. Recommendation: MRI abdomen as an outpatient to evaluate the liver and right 
renal findings. Bone scan as an outpatient. 8/31/2020 CT ABD / 3 phase liver IMPRESSION:  
1. 4.4 cm mass in hepatic segment 4A is compatible with a benign hemangioma. 2. 1.4 cm right renal mass demonstrates peripheral enhancement and is suspicious 
for renal cell carcinoma. 3. 7 mm hypodensity in hepatic segment 5 is too small to characterize. 4. Cholecystolithiasis, with no CT evidence of acute cholecystitis. 5. Bibasilar dependent airspace disease and tiny bilateral pleural effusions. 6. Additional incidental findings as detailed above. 
  
Assessment and Recommendations:  
81 yo male with unavailable PMHx admitted with lower back pain. 1. Renal mass / Lucent bone lesions: 
Suspicious for renal cell carcinoma on triple phase CT. Radiology recommends outpatient bone scan for further evaluation before proceeding with bone biopsy. Urology evaluating. -- Ordering outpatient bone scan for approx 2 weeks from now as pt is going to SNF.  
-- Outpatient HemOnc f/u in 3 weeks, scheduled and in discharge summary. 
  
2. Liver mass:  
Appears to be benign hemangioma on triple phase CT.  
  
3. Community acquired PNA: 
SARS-CoV-2 negative. Was treated with IV Zosyn followed by Ceftriaxone and Doxycycline. 4. Elevated LFTs/ T bili Unclear etiology Continue to monitor 5. Nkucezn-Gvtsk-Ezpmdd disease Follows at [facility]; has bilateral ankle foot orthotics. Continue supportive care 6. Falls/Debility:  
PT/OT evaluated and pt is going to SNF.  
 
 
Signed By: Vashti Lee MD

## 2020-09-04 NOTE — ROUTINE PROCESS
Bedside and Verbal shift change report given to JACK Bee (oncoming nurse) by Jovan Stone (offgoing nurse). Report included the following information SBAR and Kardex.

## 2020-09-04 NOTE — PROGRESS NOTES
Transition Plan of Care 
RUR 15% Plan: 1. I spoke with [address] and she stated that Auth is still pending. Patient can go to the facility over the weekend if needed. Covid results are Neg-I updated the facility. 48 hr clinicals faxed to [phone]. 
2. [facility] on call. Ambulance Packet started and placed on Chart. AVS is updated. 3. I will keep patient's daughter Aishwarya Osuna updated as well at [phone]. 
4.  to follow. 2nd IM Letter given and signed. Signed copy on chart.  
 
Gustav Mcburney, BS

## 2020-09-04 NOTE — PROGRESS NOTES
Bedside and Verbal shift change report given to Jesus Cotter RN (oncoming nurse) by Tanika Nichols RN (offgoing nurse). Report included the following information SBAR, Kardex and MAR.

## 2020-09-04 NOTE — PROGRESS NOTES
Comprehensive Nutrition Assessment Type and Reason for Visit: [facility] Nutrition Recommendations/Plan: 1. Continue with Regular diet order. 2. Try Magic Cup BID to promote intake. D/c Ensure per pt preference. Nutrition Assessment:     
9/4: F/u. Pt reports appetite is normally good at home but he hasn't been eating as well since admission d/t not liking the food. Normally eats 2 meals/day at home. Breakfast tray in room untouched. Says he ate a little better yesterday because his daughter was here and helped with his meals. Denies any recent weight changes. Dislikes Ensure, drinks Boost at home. Agreeable to trying [employer]. Ordered lunch for pt so he can get something he likes. No c/o N/V. Labs- Na 135. Meds reviewed. 9/1: 79 yo male admitted for pneumonia. PMhx: Charcot-Yasmin-Tooth disease. Overweight per BMI of 26. No weight hx available in EMR. Attempted to speak with pt at bedside, pt sleeping soundly and did not wake to me calling his name. Unable to obtain nutrition or weight hx at this time. Regular diet ordered. PO intakes documented as 0-50% meals last few days. Will order ONS and follow up with pt's acceptance. Lunch tray in room, untouched. CT found pt with liver and renal mass which MD notes is suspicious for renal cell CA. Labs reviewed. Meds: Colace. BM noted 8/26. Malnutrition Assessment: 
Malnutrition Status:     Unable to assess at this time. Estimated Daily Nutrient Needs: 
Energy (kcal):  2043 kcals(REE 1571 x AF 1.3) Protein (g):  87-104gm(1-1.2gm/kg/d) Fluid (ml/day):  2043ml(1ml/kg) Nutrition Related Findings:  LBM 8/26 Wounds:   
None Current Nutrition Therapies: DIET REGULAR 
DIET NUTRITIONAL SUPPLEMENTS Breakfast, Dinner; Ensure [employer] DIET NUTRITIONAL SUPPLEMENTS Dinner, Lunch; Thang Anthropometric Measures: 
· Height:  5' 11\" (180.3 cm) · Current Body Wt:  88.8 kg (195 lb 12.3 oz) · Admission Body Wt:      
 · Usual Body Wt:       
· Ideal Body Wt:   :     
· Adjusted Body Weight:   ; Weight Adjustment for: No adjustment · Adjusted BMI:      
· BMI Category: Overweight (BMI 25.0-29. 9) Nutrition Diagnosis:  
· Inadequate energy intake related to inadequate protein-energy intake as evidenced by intake 0-25% 9/4: Nutrition dx continues. Nutrition Interventions:  
Food and/or Nutrient Delivery: Continue current diet, Continue oral nutrition supplement Nutrition Education and Counseling: No recommendations at this time Coordination of Nutrition Care: Continued inpatient monitoring Goals: 
Consume > 50% meals + ONS within next 3-4 days Nutrition Monitoring and Evaluation:  
Behavioral-Environmental Outcomes:   
Food/Nutrient Intake Outcomes: Food and nutrient intake, Supplement intake Physical Signs/Symptoms Outcomes: GI status, Weight Discharge Planning:   
Continue current diet, Continue oral nutrition supplement Electronically signed by Lefty Hodgson RD on 9/4/2020 Contact: [phone]

## 2020-09-04 NOTE — ACP (ADVANCE CARE PLANNING)
Advance Care Planning (ACP) Provider Note - Comprehensive Date of ACP Conversation:  09/04/20 Persons included in Conversation:  patient's daughter Valentine Moreland Length of ACP Conversation in minutes:  42 minutes Authorized Decision Maker (if patient is incapable of making informed decisions): This person is: Valentine Moreland (daughter) General ACP for ALL Patients with Decision Making Capacity: 
Importance of advance care planning, including choosing a healthcare agent to communicate patient's healthcare decisions if patient lost the ability to make decisions. Review of Existing Advance Directive: 
Patient and family do have an advance directive readily available for review listing Dawna Malagon as MPOA Active Diagnoses:  
Patient Active Problem List  
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 For Serious or Chronic Illness: 
Understanding of medical condition Reviewed pt's clinical status. Liam Nix has multiple medical problems including CMT-2 and is being admitted for sepsis. We reviewed our treatment plan and anticipated discharge plans. Further, we discussed pt's wishes on how he would like to proceed (aggressive/heroic resuscitation vs not intervening and allowing nature takes its course) if he were to suffer cardiopulmonary arrest. 
 
Understanding of CPR, goals and expected outcomes, benefits and burdens discussed. Information on CPR success provided (many factors lower a patients chance of survival, including advanced age, performance status, malignancy, and presence of multiple comorbidities); Individual asked to communicate understanding of information in his/her own words. CPR works best to restart the heart when there is a sudden event, like a heart attack, in someone who is otherwise healthy. Unfortunately, CPR does not typically restart the heart for people who have serious health conditions or who are very sick. \" \"In the event your heart stopped as a result of an underlying serious health condition, would you want attempts to be made to restart your heart (answer \"yes\" for attempt to resuscitate) or would you prefer a natural death (answer \"no\" for do not attempt to resuscitate)? \" Patient's daughter, with whom I had a lengthy discussion re: his hospital course, after some careful consideration, made it very clear to me that patient would not want heroic measures for resuscitation in the event of cardiopulmonary arrest, including chest compressions, defibrillation, intubation/mechanical ventilation. Interventions Provided: DNR Also advised to complete DDNR at some point.  Daughter wishes to complete at [facility]

## 2020-09-04 NOTE — PROGRESS NOTES
Kiran Vega [facility] 
[address] [facility], [address] [place] [address] 
[phone] Medical Progress Note NAME: Arabella Sam :  1933 MRM:  [id number] Date/Time: 2020 Assessment / Plan:  
 
79 yo M /w hx of CMT-2 presenting with back pain, falls, found to have bilateral PNA and sepsis. Hospital course complicated by problems as listed below: 
 
Sepsis POA: found to have bilateral pneumonia, UTI, and bacteremia. Treat as below. Stable for DC pending SNF placement PNA: SARS-CoV-2 PCR negative. Mycoplasma IgM and Legionella Ag neg. Treated with Zosyn, and later IV CTXC and doxy. UTI (urinary tract infection) / bacteremia: Blood Cx isolated E coli and urine cx grew E coli and Proteus species. Was on Zosyn, now on IV CTX. Surveillance blood cultures NGTD 
  
Renal mass, right: ?lytic lesions in the lumbar spine concerning for metastasis. CT with contrast worrisomefor RCC. Seen by oncology and urology, who recommend outpatient follow up for further testing. Lengthy discussion with daughter. Appreciate hem/onc in updating family as well 
  
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 Supportive care. PT/OT as tolerated. Placement pending as above 
  
Dyslipidemia: can resume statin at discharge 
  
Liver mass: CT triple phase compatible with a benign hemangioma. Outpatient follow up as needed Total time spent: 35 minutes Time spent in the care of this patient including reviewing records, discussing with nursing and/or other providers on the treatment team, obtaining history and examining the patient, and discussing treatment plans. Care Plan discussed with: Patient, Nursing Staff and >50% of time spent in counseling and coordination of care Discussed:  Care Plan and D/C Planning Prophylaxis:  Lovenox Disposition:  SNF/LTC Subjective: Chief Complaint:  Follow up bactermia Chart/notes/labs/studies reviewed, patient examined. Feels fine. No CP, SOB, F/C Objective:  
 
 
Vitals:  
 
  
Last 24hrs VS reviewed since prior progress note. Most recent are: 
 
Visit Vitals /63 (BP 1 Location: Right arm, BP Patient Position: At rest) Pulse 90 Temp 98.4 °F (36.9 °C) Resp 18 Ht 5' 11\" (1.803 m) Wt 88.8 kg (195 lb 12.3 oz) SpO2 95% BMI 27.30 kg/m² SpO2 Readings from Last 6 Encounters:  
09/04/20 95% 02/19/20 97% O2 Flow Rate (L/min): 3 l/min Intake/Output Summary (Last 24 hours) at 9/4/2020 1911 Last data filed at 9/4/2020 1630 Gross per 24 hour Intake 500 ml Output  Net 500 ml Exam:  
 
Physical Exam: 
 
Gen:  Elderly, frail, chronically ill-appearing. NAD HEENT:  Sclerae nonicteric, hearing intact to voice, mucous membranes moist 
Neck:  Supple, without masses. Resp:  No accessory muscle use, CTAB without wheezes, rales, or rhonchi 
Card: RRR, without m/r/g. No LE edema. Abd:  +bowel sounds, soft, NTTP, nondistended Neuro: Face symmetric, tongue midline, speech fluent, follows commands appropriately Psych:  Alert, oriented x 3. Fair insight Medications Reviewed: (see below) Lab Data Reviewed: (see below) 
 
______________________________________________________________________ Medications:  
 
Current Facility-Administered Medications Medication Dose Route Frequency  senna-docusate (PERICOLACE) 8.6-50 mg per tablet 1 Tab  1 Tab Oral QHS  enoxaparin (LOVENOX) injection 40 mg  40 mg SubCUTAneous Q24H  cefTRIAXone (ROCEPHIN) 2 g in sterile water (preservative free) 20 mL IV syringe  2 g IntraVENous DAILY  sodium chloride (NS) flush 5-40 mL  5-40 mL IntraVENous Q8H  
 sodium chloride (NS) flush 5-40 mL  5-40 mL IntraVENous PRN  
 acetaminophen (TYLENOL) tablet 650 mg  650 mg Oral Q6H PRN  Or  
 acetaminophen (TYLENOL) suppository 650 mg  650 mg Rectal Q6H PRN  
  polyethylene glycol (MIRALAX) packet 17 g  17 g Oral DAILY PRN  promethazine (PHENERGAN) tablet 12.5 mg  12.5 mg Oral Q6H PRN Or  
 ondansetron (ZOFRAN) injection 4 mg  4 mg IntraVENous Q6H PRN  
 sodium chloride (NS) flush 5-10 mL  5-10 mL IntraVENous PRN Lab Review:  
 
Recent Labs  
  09/04/20 
0519 09/03/20 
0440 WBC 11.6* 13.3* HGB 12.9 12.8 HCT 38.5 38.0  
* 395 Recent Labs  
  09/04/20 
5480 * K 3.8  CO2 23 * BUN 23* CREA 0.52* CA 8.2* MG 2.4 PHOS 2.8 ALB 1.9* ALT 61 No components found for: [place] No results for input(s): PH, PCO2, PO2, HCO3, FIO2 in the last 72 hours. No results for input(s): INR, INREXT, INREXT in the last 72 hours. No results found for: SDES Lab Results Component Value Date/Time Culture result: NO GROWTH 4 DAYS 08/31/2020 03:29 AM  
 Culture result: NO GROWTH 4 DAYS 08/31/2020 03:28 AM  
 Culture result: LIGHT NORMAL RESPIRATORY MARK 08/29/2020 09:19 AM  
 
        
___________________________________________________ Attending Physician: Vanessa Stinson MD

## 2020-09-04 NOTE — PROGRESS NOTES
Problem: Falls - Risk of 
Goal: *Absence of Falls 9/3/2020 2322 by Ricky Paige RN Outcome: Progressing Towards Goal 
Note: Fall Risk Interventions: 
Mobility Interventions: Bed/chair exit alarm, Patient to call before getting OOB Mentation Interventions: Bed/chair exit alarm, Evaluate medications/consider consulting pharmacy Medication Interventions: Bed/chair exit alarm, Patient to call before getting OOB, Teach patient to arise slowly Elimination Interventions: Bed/chair exit alarm, Call light in reach History of Falls Interventions: Bed/chair exit alarm 9/3/2020 2030 by Ricky Paige RN Outcome: Progressing Towards Goal 
Note: Fall Risk Interventions: 
Mobility Interventions: Bed/chair exit alarm, Patient to call before getting OOB Mentation Interventions: Bed/chair exit alarm, Evaluate medications/consider consulting pharmacy Medication Interventions: Bed/chair exit alarm, Patient to call before getting OOB, Teach patient to arise slowly Elimination Interventions: Bed/chair exit alarm, Call light in reach History of Falls Interventions: Bed/chair exit alarm

## 2020-09-05 NOTE — PROGRESS NOTES
9/5/2020 Case Management Progress Note 10:02 AM 
Noted discharge order--patient does not yet have auth from [facility] to go to [facility]--I called to make sure. They are expecting the auth to come today. Silvia Baker @ [facility] my cell phone number and asked her to call as soon as it came in so I could set up transportation. Will continue to follow JULIA Marquez

## 2020-09-05 NOTE — PROGRESS NOTES
Pharmacist Discharge Medication Reconciliation Discharge Medications: My Medications START taking these medications Instructions Each Dose to Equal Morning Noon Evening Bedtime  
levoFLOXacin 750 mg tablet Commonly known as:  Levaquin Your last dose was: Your next dose is: Take 1 Tab by mouth daily. Start first dose on 9/6 
 750 mg CONTINUE taking these medications Instructions Each Dose to Equal Morning Noon Evening Bedtime Ecotrin Low Strength 81 mg tablet Generic drug:  aspirin delayed-release Your last dose was: Your next dose is: Take 81 mg by mouth daily. 81 mg 
  
  
  
  
  
lovastatin 20 mg tablet Commonly known as:  MEVACOR Your last dose was: Your next dose is: Take 20 mg by mouth nightly. 20 mg Where to Get Your Medications Information on where to get these meds will be given to you by the nurse or doctor. Ask your nurse or doctor about these medications 
levoFLOXacin 750 mg tablet The patient's chart, MAR, and AVS were reviewed by 
 Shiloh Bynum,  
Contact: [phone]

## 2020-09-05 NOTE — ROUTINE PROCESS
Bedside and Verbal shift change report given to [address] [place] [address] (oncoming nurse) by George Khanna (offgoing nurse). Report included the following information SBAR and Kardex.

## 2020-09-05 NOTE — PROGRESS NOTES
Kiran Vega [facility] 
[address] [facility], [address] [place] [address] 
[phone] Medical Progress Note NAME: Eugenia Perez :  1933 MRM:  [id number] Date/Time: 2020 Assessment / Plan:  
 
79 yo M /w hx of CMT-2 presenting with back pain, falls, found to have bilateral PNA and sepsis. Hospital course complicated by problems as listed below: 
 
Sepsis POA: found to have bilateral pneumonia, UTI, and bacteremia. Treat as below. Stable for DC pending SNF placement, still waiting insurance authorization PNA: SARS-CoV-2 PCR negative. Mycoplasma IgM and Legionella Ag neg. Treated with Zosyn, and later IV CTXC and doxy. UTI (urinary tract infection) / bacteremia: Blood Cx isolated E coli and urine cx grew E coli and Proteus species. Was on Zosyn, now on IV CTX. Surveillance blood cultures NGTD. DC on Levaquin to complete course 
  
Renal mass, right: ?lytic lesions in the lumbar spine concerning for metastasis. CT with contrast worrisomefor RCC. Seen by oncology and urology, who recommend outpatient follow up for further testing. Lengthy discussion with daughter 
  
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 Supportive care. PT/OT as tolerated. SNF placement pending 
  
Dyslipidemia: resume statin at discharge 
  
Liver mass: CT triple phase compatible with a benign hemangioma. Outpatient follow up as needed Total time spent: 25 minutes Time spent in the care of this patient including reviewing records, discussing with nursing and/or other providers on the treatment team, obtaining history and examining the patient, and discussing treatment plans. Care Plan discussed with: Patient, Nursing Staff and >50% of time spent in counseling and coordination of care Discussed:  Care Plan and D/C Planning Prophylaxis:  Lovenox Disposition:  SNF/LTC Subjective: Chief Complaint:  Follow up bactermia Chart/notes/labs/studies reviewed, patient examined. Feels well. Denies CP, SOB. No fevers Objective:  
 
 
Vitals:  
 
  
Last 24hrs VS reviewed since prior progress note. Most recent are: 
 
Visit Vitals /66 (BP 1 Location: Right arm, BP Patient Position: At rest) Pulse 100 Temp 98 °F (36.7 °C) Resp 18 Ht 5' 11\" (1.803 m) Wt 88.8 kg (195 lb 12.3 oz) SpO2 95% BMI 27.30 kg/m² SpO2 Readings from Last 6 Encounters:  
09/05/20 95% 02/19/20 97% O2 Flow Rate (L/min): 3 l/min Intake/Output Summary (Last 24 hours) at 9/5/2020 1827 Last data filed at 9/5/2020 4649 Gross per 24 hour Intake 780 ml Output  Net 780 ml Exam:  
 
Physical Exam: 
 
Gen:  Elderly, frail, chronically ill-appearing. NAD. Pleasant HEENT:  Sclerae nonicteric, hearing intact to voice, mucous membranes moist 
Neck:  Supple, without masses. Resp:  No accessory muscle use, CTAB without wheezes, rales, or rhonchi 
Card: RRR, without m/r/g. No LE edema. Abd:  +bowel sounds, soft, NTTP, nondistended Neuro: Face symmetric, tongue midline, speech fluent, follows commands appropriately Psych:  Alert, oriented to self and hospital. Limited insight Medications Reviewed: (see below) Lab Data Reviewed: (see below) 
 
______________________________________________________________________ Medications:  
 
Current Facility-Administered Medications Medication Dose Route Frequency  senna-docusate (PERICOLACE) 8.6-50 mg per tablet 1 Tab  1 Tab Oral QHS  enoxaparin (LOVENOX) injection 40 mg  40 mg SubCUTAneous Q24H  cefTRIAXone (ROCEPHIN) 2 g in sterile water (preservative free) 20 mL IV syringe  2 g IntraVENous DAILY  sodium chloride (NS) flush 5-40 mL  5-40 mL IntraVENous Q8H  
 sodium chloride (NS) flush 5-40 mL  5-40 mL IntraVENous PRN  
 acetaminophen (TYLENOL) tablet 650 mg  650 mg Oral Q6H PRN  Or  
  acetaminophen (TYLENOL) suppository 650 mg  650 mg Rectal Q6H PRN  polyethylene glycol (MIRALAX) packet 17 g  17 g Oral DAILY PRN  promethazine (PHENERGAN) tablet 12.5 mg  12.5 mg Oral Q6H PRN Or  
 ondansetron (ZOFRAN) injection 4 mg  4 mg IntraVENous Q6H PRN  
 sodium chloride (NS) flush 5-10 mL  5-10 mL IntraVENous PRN Lab Review:  
 
Recent Labs  
  09/04/20 
0519 09/03/20 
0440 WBC 11.6* 13.3* HGB 12.9 12.8 HCT 38.5 38.0  
* 395 Recent Labs  
  09/04/20 
9190 * K 3.8  CO2 23 * BUN 23* CREA 0.52* CA 8.2* MG 2.4 PHOS 2.8 ALB 1.9* ALT 61 No components found for: [place] No results for input(s): PH, PCO2, PO2, HCO3, FIO2 in the last 72 hours. No results for input(s): INR, INREXT, INREXT in the last 72 hours. No results found for: SDES Lab Results Component Value Date/Time Culture result: NO GROWTH 5 DAYS 08/31/2020 03:29 AM  
 Culture result: NO GROWTH 5 DAYS 08/31/2020 03:28 AM  
 Culture result: LIGHT NORMAL RESPIRATORY MARK 08/29/2020 09:19 AM  
 
        
___________________________________________________ Attending Physician: Tiffani Cobb MD

## 2020-09-05 NOTE — PROGRESS NOTES
Problem: Falls - Risk of 
Goal: *Absence of Falls Outcome: Progressing Towards Goal 
Note: Fall Risk Interventions: 
Mobility Interventions: Bed/chair exit alarm, Communicate number of staff needed for ambulation/transfer Mentation Interventions: Bed/chair exit alarm, Door open when patient unattended, More frequent rounding, Reorient patient Medication Interventions: Bed/chair exit alarm Elimination Interventions: Bed/chair exit alarm, Call light in reach History of Falls Interventions: Bed/chair exit alarm

## 2020-09-05 NOTE — PROGRESS NOTES
Bedside and Verbal shift change report given to vamshi rn  (oncoming nurse) by Robert Zavala  (offgoing nurse). Report included the following information SBAR and Kardex.

## 2020-09-06 NOTE — PROGRESS NOTES
Problem: Falls - Risk of 
Goal: *Absence of Falls Outcome: Progressing Towards Goal 
Note: Fall Risk Interventions: 
Mobility Interventions: Communicate number of staff needed for ambulation/transfer, Patient to call before getting OOB Mentation Interventions: Bed/chair exit alarm, Reorient patient Medication Interventions: Bed/chair exit alarm, Patient to call before getting OOB, Teach patient to arise slowly Elimination Interventions: Bed/chair exit alarm, Call light in reach History of Falls Interventions: Bed/chair exit alarm

## 2020-09-06 NOTE — PROGRESS NOTES
Bedside and Verbal shift change report given to Nancy wahl  (oncoming nurse) by Joleen Villagran  (offgoing nurse). Report included the following information SBAR and Kardex.

## 2020-09-06 NOTE — PROGRESS NOTES
Bedside and Verbal shift change report given to Nancy RN (oncoming nurse) by Denise Francois RN (offgoing nurse). Report included the following information SBAR and Kardex. Introduced self as primary RN. Initial assessment completed. VSS. Pt denies additional complaints at this time. Plan for the evening discussed with pt and he verbalized understanding. Bed locked and in low position with call bell within reach. Instructed pt to press call mcknight when needed. White board updated with this RN's name. Bedside and Verbal shift change report given to Claudia RN (oncoming nurse) by Butler Epley, RN (offgoing nurse). Report included the following information SBAR and Kardex.

## 2020-09-06 NOTE — PROGRESS NOTES
Kiran Vega pérez [place] [address] 
[address] 
[phone] Medical Progress Note NAME: Amy Ashraf :  1933 MRM:  [id number] Date/Time: 2020 Assessment / Plan:  
 
79 yo M w/ hx of CMT-2 presenting with back pain, falls, found to have bilateral PNA and sepsis. Hospital course complicated by problems as listed below: 
 
Sepsis POA: found to have bilateral pneumonia, UTI, and bacteremia. Treat as below. Stable for DC pending SNF placement since 9/3, awaiting insurance authorization PNA: SARS-CoV-2 PCR negative. Mycoplasma IgM and Legionella Ag neg. Treated with Zosyn, and later IV CTX and doxy. UTI (urinary tract infection) / bacteremia: Blood Cx isolated E coli and urine cx grew E coli and Proteus species. Was on Zosyn, now on IV CTX. Surveillance blood cultures NGTD. DC on Levaquin to complete course 
  
Renal mass, right: ?lytic lesions in the lumbar spine concerning for metastasis. CT with contrast worrisome for RCC. Evaluated by oncology and urology, who recommend outpatient follow up for further testing. Lengthy discussion with daughter 
  
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 Supportive care. PT/OT as tolerated. SNF placement pending 
  
Dyslipidemia: resume statin at discharge 
  
Liver mass: CT triple phase showed findings c/w benign hemangioma. Outpatient follow up as needed Total time spent: 25 minutes, d/w CM Time spent in the care of this patient including reviewing records, discussing with nursing and/or other providers on the treatment team, obtaining history and examining the patient, and discussing treatment plans. Care Plan discussed with: Patient, Nursing Staff and >50% of time spent in counseling and coordination of care Discussed:  Care Plan and D/C Planning Prophylaxis:  Lovenox Disposition:  SNF/LTC Subjective: Chief Complaint:  Follow up bactermia Chart/notes/labs/studies reviewed, patient examined. Feels tired. No fevers. No abd pain Objective:  
 
 
Vitals:  
 
  
Last 24hrs VS reviewed since prior progress note. Most recent are: 
 
Visit Vitals /74 (BP 1 Location: Right arm, BP Patient Position: At rest) Pulse 90 Temp 98.1 °F (36.7 °C) Resp 18 Ht 5' 11\" (1.803 m) Wt 88.8 kg (195 lb 12.3 oz) SpO2 95% BMI 27.30 kg/m² SpO2 Readings from Last 6 Encounters:  
09/06/20 95% 02/19/20 97% O2 Flow Rate (L/min): 3 l/min Intake/Output Summary (Last 24 hours) at 9/6/2020 1751 Last data filed at 9/6/2020 4182 Gross per 24 hour Intake 250 ml Output  Net 250 ml Exam:  
 
Physical Exam: 
 
Gen:  Elderly, frail, chronically ill-appearing. NAD. Very pleasant HEENT:  Sclerae nonicteric, hearing intact to voice, mucous membranes moist 
Neck:  Supple, without masses. Resp:  No accessory muscle use, CTAB without wheezes, rales, or rhonchi 
Card: RRR, without m/r/g. No LE edema. Abd:  +bowel sounds, soft, NTTP, nondistended Neuro: Face symmetric, tongue midline, speech fluent, follows commands appropriately Psych:  Alert, oriented to self and hospital. Limited insight Medications Reviewed: (see below) Lab Data Reviewed: (see below) 
 
______________________________________________________________________ Medications:  
 
Current Facility-Administered Medications Medication Dose Route Frequency  senna-docusate (PERICOLACE) 8.6-50 mg per tablet 1 Tab  1 Tab Oral QHS  enoxaparin (LOVENOX) injection 40 mg  40 mg SubCUTAneous Q24H  cefTRIAXone (ROCEPHIN) 2 g in sterile water (preservative free) 20 mL IV syringe  2 g IntraVENous DAILY  sodium chloride (NS) flush 5-40 mL  5-40 mL IntraVENous Q8H  
 sodium chloride (NS) flush 5-40 mL  5-40 mL IntraVENous PRN  
 acetaminophen (TYLENOL) tablet 650 mg  650 mg Oral Q6H PRN  Or  
  acetaminophen (TYLENOL) suppository 650 mg  650 mg Rectal Q6H PRN  polyethylene glycol (MIRALAX) packet 17 g  17 g Oral DAILY PRN  promethazine (PHENERGAN) tablet 12.5 mg  12.5 mg Oral Q6H PRN Or  
 ondansetron (ZOFRAN) injection 4 mg  4 mg IntraVENous Q6H PRN  
 sodium chloride (NS) flush 5-10 mL  5-10 mL IntraVENous PRN Lab Review:  
 
Recent Labs  
  09/04/20 
0519 WBC 11.6* HGB 12.9 HCT 38.5 * Recent Labs  
  09/04/20 
3754 * K 3.8  CO2 23 * BUN 23* CREA 0.52* CA 8.2* MG 2.4 PHOS 2.8 ALB 1.9* ALT 61 No components found for: [place] No results for input(s): PH, PCO2, PO2, HCO3, FIO2 in the last 72 hours. No results for input(s): INR, INREXT, INREXT in the last 72 hours. No results found for: SDES Lab Results Component Value Date/Time Culture result: NO GROWTH 5 DAYS 08/31/2020 03:29 AM  
 Culture result: NO GROWTH 5 DAYS 08/31/2020 03:28 AM  
 Culture result: LIGHT NORMAL RESPIRATORY MARK 08/29/2020 09:19 AM  
 
        
___________________________________________________ Attending Physician: Vanessa Stinson MD

## 2020-09-06 NOTE — ROUTINE PROCESS
Bedside and Verbal shift change report given to [facility] (oncoming nurse) by Riaz Hernandez (offgoing nurse). Report included the following information SBAR and Kardex.

## 2020-09-07 NOTE — PROGRESS NOTES
Bedside and Verbal shift change report given to Richard Christensen RN (oncoming nurse) by Jarvis Loera RN (offgoing nurse). Report included the following information SBAR, Kardex and MAR.

## 2020-09-07 NOTE — PROGRESS NOTES
[facility] Medical Progress Note NAME: Dedra Mclean :  1933 MRM:  [id number] Date/Time of service 2020  9:35 AM    
 
  
Assessment and Plan:  
 
E coli bacteremia / Sepsis - POA . Likely due to pyelonephritis. Cx  Negative. Complete 10 days PO levaquin. Stable for DC Pyelonephritis / UTI (urinary tract infection) / Renal mass - Ur cx grew E coli and Proteus species. Complete Levaquin as above Debilitated patient / Charcot-Yasmin-Tooth disease type 2 - Worse due to deconditioning and infection. Appreciate PT eval.  Awaiting SNF placement since 9/3, delay awaiting insurance authorization. CMT followed at [facility] and uses bilateral ankle foot orthotics. Supportive care. 
  
Renal mass, right / Liver mass / lytic lesions in the lumbar spine - All concerning for RCC with metastasis. Evaluated by oncology and urology, who recommend outpatient follow up for further testing. Lengthy discussion with daughter. Liver mass may be hemangioma 
  
Dyslipidemia - Stop statin at discharge, unless we rule out stage 4 RCC 
  
Bilateral PNA - POA, based on xray and sepsis. But sepsis due to pyelo. All pulmonary testing negative (SARS-CoV-2, Mycoplasma, Legionella). I reject Dx of PNA   
  
  
Subjective: Chief Complaint:  Awaiting DC 
 
ROS: 
(bold if positive, if negative) Tolerating some PT  Tolerating Diet Objective:  
 
Last 24hrs VS reviewed since prior progress note. Most recent are: 
 
Visit Vitals /68 (BP 1 Location: Right arm, BP Patient Position: At rest) Pulse 90 Temp 97.4 °F (36.3 °C) Resp 24 Ht 5' 11\" (1.803 m) Wt 88.8 kg (195 lb 12.3 oz) SpO2 97% BMI 27.30 kg/m² SpO2 Readings from Last 6 Encounters:  
20 97% 20 97% O2 Flow Rate (L/min): 3 l/min Intake/Output Summary (Last 24 hours) at 2020 0968 Last data filed at 2020 1902 Gross per 24 hour Intake 120 ml Output  Net 120 ml  
  
 
 Physical Exam: 
 
Gen:  Well-developed, well-nourished, in no acute distress HEENT:  Pink conjunctivae, PERRL, hearing intact to voice, moist mucous membranes Neck:  Supple, without masses, thyroid non-tender Resp:  No accessory muscle use, clear breath sounds without wheezes rales or rhonchi 
Card:  No murmurs, normal S1, S2 without thrills, bruits or peripheral edema Abd:  Soft, non-tender, non-distended, normoactive bowel sounds are present, no mass Lymph:  No cervical or inguinal adenopathy Musc:  No cyanosis or clubbing Skin:  No rashes or ulcers, skin turgor is good Neuro:  Cranial nerves are grossly intact, no focal motor weakness, follows commands Psych:  Good insight, oriented to person, place and time, alert Telemetry reviewed:   normal sinus rhythm 
__________________________________________________________________ Medications Reviewed: (see below) Medications:  
 
Current Facility-Administered Medications Medication Dose Route Frequency  senna-docusate (PERICOLACE) 8.6-50 mg per tablet 1 Tab  1 Tab Oral QHS  enoxaparin (LOVENOX) injection 40 mg  40 mg SubCUTAneous Q24H  cefTRIAXone (ROCEPHIN) 2 g in sterile water (preservative free) 20 mL IV syringe  2 g IntraVENous DAILY  sodium chloride (NS) flush 5-40 mL  5-40 mL IntraVENous Q8H  
 sodium chloride (NS) flush 5-40 mL  5-40 mL IntraVENous PRN  
 acetaminophen (TYLENOL) tablet 650 mg  650 mg Oral Q6H PRN Or  
 acetaminophen (TYLENOL) suppository 650 mg  650 mg Rectal Q6H PRN  polyethylene glycol (MIRALAX) packet 17 g  17 g Oral DAILY PRN  promethazine (PHENERGAN) tablet 12.5 mg  12.5 mg Oral Q6H PRN Or  
 ondansetron (ZOFRAN) injection 4 mg  4 mg IntraVENous Q6H PRN  
 sodium chloride (NS) flush 5-10 mL  5-10 mL IntraVENous PRN Lab Data Reviewed: (see below) Lab Review: No results for input(s): WBC, HGB, HCT, PLT, HGBEXT, HCTEXT, PLTEXT in the last 72 hours. No results for input(s): NA, K, CL, CO2, GLU, BUN, CREA, CA, MG, PHOS, ALB, TBIL, TBILI, ALT, INR, INREXT in the last 72 hours. No lab exists for component: SGOT No results found for: [facility] No results for input(s): PH, PCO2, PO2, HCO3, FIO2 in the last 72 hours. No results for input(s): INR, INREXT in the last 72 hours. All Micro Results Procedure Component Value Units Date/Time CULTURE, BLOOD [[id number]] Collected:  08/31/20 0328 Order Status:  Completed Specimen:  Blood Updated:  09/07/20 5501 Special Requests: NO SPECIAL REQUESTS Culture result: NO GROWTH 7 DAYS     
 CULTURE, BLOOD [[id number]] Collected:  08/31/20 0329 Order Status:  Completed Specimen:  Blood Updated:  09/07/20 0331 Special Requests: NO SPECIAL REQUESTS Culture result: NO GROWTH 7 DAYS     
 CULTURE, RESPIRATORY/SPUTUM[facility] [[id number]] Collected:  08/29/20 0919 Order Status:  Completed Specimen:  Sputum Updated:  08/31/20 1019 Special Requests: NO SPECIAL REQUESTS     
  GRAM STAIN    
  OCCASIONAL EPITHELIAL CELLS SEEN  
     
   FEW WBCS SEEN     
   NO ORGANISMS SEEN Culture result:    
  LIGHT NORMAL RESPIRATORY MARK  
     
 LEGIONELLA PNEUMOPHILA AG, URINE [[id number]] Collected:  08/28/20 2320 Order Status:  Completed Specimen:  Urine Updated:  08/30/20 1336 Source URINE     
  L pneumophila S1 Ag, urine Negative Comment: (NOTE) Presumptive negative for L. pneumophila serogroup 1 antigen in urine, 
suggesting no recent or current infection. Legionnaires' disease 
cannot be ruled out since other serogroups and species may also 
cause disease. Performed At: [id number] Lane Brito MD XN:[id number] DARLINE [place], UR/CSF [[id number]] Collected:  08/28/20 2320 Order Status:  Completed Specimen:  Miscellaneous sample Updated:  08/30/20 1336 Source URINE Specimen Urine Streptococcus pneumoniae Ag Negative Fluid culture Not indicated. Organism ID Not indicated. Please note Comment Comment: (NOTE) College of American Pathologists standards require a culture to be 
performed on CSF specimens submitted for bacterial antigen testing. (CAP [id number]) Urine specimens will not be cultured. Performed At: [address] [id number] Vincent Murguia MD MC:[id number] CULTURE, URINE [[id number]]  (Abnormal)  (Susceptibility) Collected:  08/27/20 2000 Order Status:  Completed Specimen:  Urine from Clean catch Updated:  08/30/20 6523 Special Requests: NO SPECIAL REQUESTS [place] Count --     
  >100,000 COLONIES/mL Culture result: ESCHERICHIA COLI     
   PROTEUS MIRABILIS     
 CULTURE, BLOOD [[id number]]  (Abnormal) Collected:  08/27/20 1859 Order Status:  Completed Specimen:  Blood Updated:  08/30/20 3889 Special Requests: NO SPECIAL REQUESTS Culture result:    
  GRAM NEGATIVE RODS GROWING IN BOTH BOTTLES DRAWN (SITE = LFA) REFER TO [facility] [id number] FOR ID AND SENSITIVITIES  
 CULTURE, BLOOD [[id number]]  (Abnormal)  (Susceptibility) Collected:  08/27/20 1922 Order Status:  Completed Specimen:  Blood Updated:  08/30/20 1824 Special Requests: NO SPECIAL REQUESTS Culture result:    
  ESCHERICHIA COLI GROWING IN BOTH BOTTLES DRAWN (SITES = R AC) MYCOPLASMA AB, IGM [[id number]] Collected:  08/29/20 0039 Order Status:  Completed Specimen:  Serum Updated:  08/29/20 1124 Mycoplasma Ab, IgM NONREACTIVE     
 RESPIRATORY PANEL,PCR,NASOPHARYNGEAL [[id number]] Collected:  08/28/20 1829 Order Status:  Completed Specimen:  Nasopharyngeal Updated:  08/29/20 0002 Adenovirus Not detected Coronavirus 229E Not detected Coronavirus HKU1 Not detected Coronavirus CVNL63 Not detected Coronavirus OC43 Not detected Metapneumovirus Not detected Rhinovirus and Enterovirus Not detected Influenza A Not detected Influenza A, subtype H1 Not detected Influenza A, subtype H3 Not detected INFLUENZA A H1N1 PCR Not detected Influenza B Not detected Parainfluenza 1 Not detected Parainfluenza 2 Not detected Parainfluenza 3 Not detected Parainfluenza virus 4 Not detected RSV by PCR Not detected B. parapertussis, PCR Not detected Bordetella pertussis - PCR Not detected Chlamydophila pneumoniae DNA, QL, PCR Not detected Mycoplasma pneumoniae DNA, QL, PCR Not detected MICRO TRACKING [[id number]] Collected:  08/27/20 1922 Order Status:  Completed Updated:  08/28/20 3766 MICRO TRACKING [[id number]] Collected:  08/27/20 1922 Order Status:  Completed Updated:  08/28/20 6743 [facility]  [[id number]] Collected:  08/27/20 1859 Order Status:  Completed Updated:  08/28/20 6854 [facility]  [[id number]] Collected:  08/27/20 1859 Order Status:  Completed Updated:  08/28/20 0540 CULTURE, RESPIRATORY/SPUTUM/BRONCH Juan Pablo Inocente STAIN [[id number]] Order Status:  Canceled Specimen:  Sputum URINE CULTURE HOLD SAMPLE [[id number]] Collected:  08/27/20 2000 Order Status:  Completed Specimen:  Urine from Serum Updated:  08/27/20 2012 Urine culture hold Urine on hold in Microbiology dept for 2 days. If unpreserved urine is submitted, it cannot be used for addtional testing after 24 hours, recollection will be required. Other pertinent lab: none Total time spent with patient: 39 Minutes I personally reviewed chart, notes, data and current medications in the medical record. I have personally examined and treated the patient at bedside during this period. Care Plan discussed with: Patient, Care Manager, Nursing Staff and >50% of time spent in counseling and coordination of care Discussed:  Care Plan and D/C Planning Prophylaxis:  H2B/PPI Disposition:  SNF/LTC 
        
___________________________________________________ Attending Physician: Rod Pack MD

## 2020-09-07 NOTE — ROUTINE PROCESS
Bedside and Verbal shift change report given to Jluis Pillai (oncoming nurse) by R. Jeffrey Landau RN (offgoing nurse). Report included the following information SBAR, Kardex, ED Summary, MAR, Recent Results and Cardiac Rhythm NSR.

## 2020-09-07 NOTE — DISCHARGE SUMMARY
Physician Discharge Summary Patient ID: 
Elayne Frances [id number] 
53 y.o. 
7/21/1933 Admit date: 8/27/2020 Discharge date of service and time: 9/8/2020 Admission Diagnoses: Bilateral pneumonia [J18.9] Discharge Diagnoses:   
Principal Diagnosis E coli bacteremia Hospital Course and other diagnoses E coli bacteremia / Sepsis - POA 8/27. Presumed due to pyelonephritis. Cx 8/29 Negative. Completed 10 days PO levaquin. Stable for DC 
  
Pyelonephritis / UTI (urinary tract infection) / Renal mass - Ur cx grew E coli and Proteus species. Completed Levaquin as above 
  
Debilitated patient / Charcot-Yasmin-Tooth disease type 2 - Worse due to deconditioning and infection. Appreciate PT eval.  Awaiting SNF placement since 9/3, 5 days delay awaiting insurance authorization. CMT followed at [facility] and uses bilateral ankle foot orthotics. Supportive care. 
  
Renal mass, right / Liver mass / lytic lesions in the lumbar spine - All concerning for RCC with metastasis.  Evaluated by oncology and urology, who recommend outpatient follow up for further testing. Lengthy discussion with daughter. Liver mass may be hemangioma 
  
Dyslipidemia - Stop statin at discharge, unless we rule out stage 4 RCC 
  
Bilateral PNA - POA, based on xray and sepsis. But sepsis was due to pyelo. All pulmonary testing negative (SARS-CoV-2, Mycoplasma, Legionella). I reject initial Dx of PNA   
   
PCP: Other, MD Jitendra 
 
Consults: Hematology/Oncology and Urology Significant Diagnostic Studies: See Hospital Course Discharged home in improved condition. Discharge Exam: 
/62 (BP 1 Location: Right arm, BP Patient Position: At rest) Pulse (!) 103 Temp 98.2 °F (36.8 °C) Resp 22 Ht 5' 11\" (1.803 m) Wt 88.5 kg (195 lb) SpO2 97% BMI 27.20 kg/m² Gen:  Well-developed, well-nourished, in no acute distress HEENT:  Pink conjunctivae, PERRL, hearing intact to voice, moist mucous membranes Neck:  Supple, without masses, thyroid non-tender Resp:  No accessory muscle use, clear breath sounds without wheezes rales or rhonchi 
Card:  No murmurs, tachycardic S1, S2 without thrills, bruits or peripheral edema Abd:  Soft, non-tender, non-distended, normoactive bowel sounds are present, no mass Lymph:  No cervical or inguinal adenopathy Musc:  No cyanosis or clubbing Skin:  No rashes or ulcers, skin turgor is good Neuro:  Cranial nerves are grossly intact, no focal motor weakness, follows commands Psych:  Good insight, oriented to person, place and time, alert Patient Instructions:  
Current Discharge Medication List  
  
START taking these medications Details  
levoFLOXacin (Levaquin) 750 mg tablet Take 1 Tab by mouth daily. Start first dose on 9/7 Qty: 2 Tab, Refills: 0 STOP taking these medications  
  
 aspirin delayed-release (ECOTRIN LOW STRENGTH) 81 mg tablet Comments:  
Reason for Stopping:   
   
 lovastatin (MEVACOR) 20 mg tablet Comments:  
Reason for Stopping:   
   
  
 
Activity: Activity as tolerated and PT/OT Eval and Treat Diet: Regular Diet and Increase noncaffeinated fluids Wound Care: None needed Follow-up with Oncology and Urology in a few weeks. Follow-up tests/labs - none Signed: 
Esther Osorio MD 
9/8/2020 
9:56 AM

## 2020-09-07 NOTE — PROGRESS NOTES
Spiritual Care Assessment/Progress Note [address] 
 
 
NAME: Carlos Eduardo Mccullough      MRN: [id number] AGE: 80 y.o. SEX: male Episcopal Affiliation: No preference Language: [place] 9/7/2020     Total Time (in minutes): 17 Spiritual Assessment begun in [facility]  MED SURG 2 through conversation with: 
  
    [x]Patient        [] Family    [] Friend(s) Reason for Consult: Initial/Spiritual assessment, patient floor Spiritual beliefs: (Please include comment if needed) [x] Identifies with a jo tradition:   Jehovah's witness    
   [] Supported by a jo community:        
   [] Claims no spiritual orientation:       
   [] Seeking spiritual identity:            
   [] Adheres to an individual form of spirituality:       
   [] Not able to assess:                   
 
    
Identified resources for coping:  
   [x] Prayer                           
   [] Music                  [] Guided Imagery 
   [] Family/friends                 [] Pet visits [] Devotional reading                         [] Unknown 
   [] Other:                                     
 
 
Interventions offered during this visit: (See comments for more details) Patient Interventions: Affirmation of emotions/emotional suffering, Affirmation of jo, Catharsis/review of pertinent events in supportive environment, Initial/Spiritual assessment, patient floor, Life review/legacy, Prayer (actual), Prayer (assurance of) Plan of Care: 
 
 [] Support spiritual and/or cultural needs  
 [] Support AMD and/or advance care planning process    
 [] Support grieving process 
 [] Coordinate Rites and/or Rituals  
 [] Coordination with community clergy [] No spiritual needs identified at this time 
 [] Detailed Plan of Care below (See Comments)  [] Make referral to Music Therapy 
[] Make referral to Pet Therapy    
[] Make referral to Addiction services 
[] Make referral to [facility] [] Make referral to Spiritual Care Partner 
[] No future visits requested       
[x] Follow up visits as needed Comments: Initial spiritual assessment in 5 Med Surg. Mr. Massimo Mccracken shared he has a strong Nondenominational jo in [facility] . He shared he was raised Latter day and met his wife who was Nondenominational and he joined with her for Caodaism. He has three children, a daughter who lives in the area. He has one son who has  and another son who has been struggling. He shared he hope his son who is struggling has found a way forward that is healthy for him. Mr. Massimo Mccracken shared he is due to go to a rehab. Provided listening presence as he shared about his family and jo. He requested prayer. Provided spiritual presence and prayer. Advised of  Availability. Visited by: Chioma Rivas., MS., [address] ([id number])

## 2020-09-08 NOTE — PROGRESS NOTES
Bedside and Verbal shift change report given to Mahesh Segura RN (oncoming nurse) by Anabella Leach. Jean Claude Vance RN (offgoing nurse).  Report included the following information SBAR, Kardex, Recent results, MAR

## 2020-09-08 NOTE — PROGRESS NOTES
I have reviewed discharge instructions with the patient and caregiver. The patient and caregiver verbalized understanding. Peripheral IV removed upon D/C. TRANSFER - OUT REPORT: 
 
Verbal report given to Flores(name) on Angelica Stanton  being transferred to [place](unit) for routine progression of care Report consisted of patients Situation, Background, Assessment and  
Recommendations(SBAR). Information from the following report(s) SBAR, Kardex and MAR was reviewed with the receiving nurse. Lines:  
Peripheral IV 08/27/20 Left Forearm (Active) Site Assessment Clean, dry, & intact 09/08/20 0815 Phlebitis Assessment 0 09/08/20 0815 Infiltration Assessment 0 09/08/20 0815 Dressing Status Intact 09/08/20 0815 Dressing Type Transparent;Tape 09/08/20 0310 Hub Color/Line Status End cap changed; Flushed 09/08/20 0310 Action Taken Open ports on tubing capped 09/08/20 0310 Alcohol Cap Used Yes 09/08/20 0310 Opportunity for questions and clarification was provided. Patient transported with: 
 [employer]

## 2020-09-08 NOTE — PROGRESS NOTES
Transition Plan of Care 
RUR 15% 
  
  
Plan: 1. I spoke with RETAIL PRO/Amy and she stated that Auth is still pending. Covid results are Neg-I updated the facility. 48 hr clinicals refaxed to [phone]. 
2. [facility] on call. Ambulance Packet started and placed on Chart. AVS is updated.  
3. I will keep patient's daughter Jose Antonio Party updated as well at [phone]. 
4.  to follow. 
  
2nd IM Letter given and signed on 9/4  Signed copy on chart. 
  
Sammie Kelly BS

## 2020-09-08 NOTE — PROGRESS NOTES
[facility] Medical Progress Note NAME: Carlos Eduardo Mccullough :  1933 MRM:  [id number] Date/Time of service 2020  11:07 AM    
 
  
Assessment and Plan:  
 
E coli bacteremia / Sepsis - POA . Presumed due to pyelonephritis. Cx  Negative. Completed 10 days PO levaquin. Stable for DC Pyelonephritis / UTI (urinary tract infection) / Renal mass - Ur cx grew E coli and Proteus species. 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 Appreciate PT eval.  Awaiting SNF placement since 9/3, 5 days delay awaiting insurance authorization. CMT followed at [facility] and uses bilateral ankle foot orthotics. Supportive care. 
  
Renal mass, right / Liver mass / lytic lesions in the lumbar spine - All concerning for RCC with metastasis. Evaluated by oncology and urology, who recommend outpatient follow up for further testing. Lengthy discussion with daughter. Liver mass may be hemangioma 
  
Dyslipidemia - Stop statin at discharge, unless we rule out stage 4 RCC 
  
Bilateral PNA - POA, based on xray and sepsis. But sepsis due to pyelo. All pulmonary testing negative (SARS-CoV-2, Mycoplasma, Legionella). I reject Dx of PNA   
  
  
Subjective: Chief Complaint:  Awaiting DC 
 
ROS: 
(bold if positive, if negative) Tolerating some PT  Tolerating Diet Objective:  
 
Last 24hrs VS reviewed since prior progress note. Most recent are: 
 
Visit Vitals /62 (BP 1 Location: Right arm, BP Patient Position: At rest) Pulse (!) 103 Temp 98.2 °F (36.8 °C) Resp 22 Ht 5' 11\" (1.803 m) Wt 88.5 kg (195 lb) SpO2 97% BMI 27.20 kg/m² SpO2 Readings from Last 6 Encounters:  
20 97% 20 97% O2 Flow Rate (L/min): 3 l/min Intake/Output Summary (Last 24 hours) at 2020 1107 Last data filed at 2020 4354 Gross per 24 hour Intake 440 ml Output  Net 440 ml  
  
 
 Physical Exam: 
 
Gen:  Well-developed, well-nourished, in no acute distress HEENT:  Pink conjunctivae, PERRL, hearing intact to voice, moist mucous membranes Neck:  Supple, without masses, thyroid non-tender Resp:  No accessory muscle use, clear breath sounds without wheezes rales or rhonchi 
Card:  No murmurs, tachycardic S1, S2 without thrills, bruits or peripheral edema Abd:  Soft, non-tender, non-distended, normoactive bowel sounds are present, no mass Lymph:  No cervical or inguinal adenopathy Musc:  No cyanosis or clubbing Skin:  No rashes or ulcers, skin turgor is good Neuro:  Cranial nerves are grossly intact, no focal motor weakness, follows commands Psych:  Good insight, oriented to person, place and time, alert Telemetry reviewed:   normal sinus rhythm 
__________________________________________________________________ Medications Reviewed: (see below) Medications:  
 
Current Facility-Administered Medications Medication Dose Route Frequency  levoFLOXacin (LEVAQUIN) tablet 750 mg  750 mg Oral ACB  lactobac ac& pc-s.therm-b.anim (MARK Q/RISAQUAD)  1 Cap Oral DAILY  senna-docusate (PERICOLACE) 8.6-50 mg per tablet 1 Tab  1 Tab Oral QHS  enoxaparin (LOVENOX) injection 40 mg  40 mg SubCUTAneous Q24H  
 sodium chloride (NS) flush 5-40 mL  5-40 mL IntraVENous Q8H  
 sodium chloride (NS) flush 5-40 mL  5-40 mL IntraVENous PRN  
 acetaminophen (TYLENOL) tablet 650 mg  650 mg Oral Q6H PRN  polyethylene glycol (MIRALAX) packet 17 g  17 g Oral DAILY PRN  
 ondansetron (ZOFRAN) injection 4 mg  4 mg IntraVENous Q6H PRN  
 sodium chloride (NS) flush 5-10 mL  5-10 mL IntraVENous PRN Lab Data Reviewed: (see below) Lab Review: No results for input(s): WBC, HGB, HCT, PLT, HGBEXT, HCTEXT, PLTEXT, HGBEXT, HCTEXT, PLTEXT in the last 72 hours.  
No results for input(s): NA, K, CL, CO2, GLU, BUN, CREA, CA, MG, PHOS, ALB, TBIL, TBILI, ALT, INR, INREXT, INREXT in the last 72 hours. No lab exists for component: SGOT No results found for: [place] No results for input(s): PH, PCO2, PO2, HCO3, FIO2 in the last 72 hours. No results for input(s): INR, INREXT, INREXT in the last 72 hours. All Micro Results Procedure Component Value Units Date/Time CULTURE, BLOOD [[id number]] Collected:  08/31/20 0328 Order Status:  Completed Specimen:  Blood Updated:  09/07/20 6792 Special Requests: NO SPECIAL REQUESTS Culture result: NO GROWTH 7 DAYS     
 CULTURE, BLOOD [[id number]] Collected:  08/31/20 0329 Order Status:  Completed Specimen:  Blood Updated:  09/07/20 4453 Special Requests: NO SPECIAL REQUESTS Culture result: NO GROWTH 7 DAYS     
 CULTURE, RESPIRATORY/SPUTUM/BRONCH Nupur Hoover [[id number]] Collected:  08/29/20 0919 Order Status:  Completed Specimen:  Sputum Updated:  08/31/20 1019 Special Requests: NO SPECIAL REQUESTS     
  GRAM STAIN    
  OCCASIONAL EPITHELIAL CELLS SEEN  
     
   FEW WBCS SEEN     
   NO ORGANISMS SEEN Culture result:    
  LIGHT NORMAL RESPIRATORY MARK  
     
 LEGIONELLA PNEUMOPHILA AG, URINE [[id number]] Collected:  08/28/20 2320 Order Status:  Completed Specimen:  Urine Updated:  08/30/20 1336 Source URINE     
  L pneumophila S1 Ag, urine Negative Comment: (NOTE) Presumptive negative for L. pneumophila serogroup 1 antigen in urine, 
suggesting no recent or current infection. Legionnaires' disease 
cannot be ruled out since other serogroups and species may also 
cause disease. Performed At: [address] [id number] Catalina Tavarez MD YV:[id number] DARLINE French, UR/CSF [[id number]] Collected:  08/28/20 2320 Order Status:  Completed Specimen:  Miscellaneous sample Updated:  08/30/20 1336 Source URINE Specimen Urine Streptococcus pneumoniae Ag Negative Fluid culture Not indicated. Organism ID Not indicated. Please note Comment Comment: (NOTE) College of American Pathologists standards require a culture to be 
performed on CSF specimens submitted for bacterial antigen testing. (CAP [id number]) Urine specimens will not be cultured. Performed At: [address] [id number] Bernarda Jenkins MD [id number] CULTURE, URINE [[id number]]  (Abnormal)  (Susceptibility) Collected:  08/27/20 2000 Order Status:  Completed Specimen:  Urine from Clean catch Updated:  08/30/20 3337 Special Requests: NO SPECIAL REQUESTS Hurricane Count --     
  >100,000 COLONIES/mL Culture result: ESCHERICHIA COLI     
   PROTEUS MIRABILIS     
 CULTURE, BLOOD [[id number]]  (Abnormal) Collected:  08/27/20 1859 Order Status:  Completed Specimen:  Blood Updated:  08/30/20 1295 Special Requests: NO SPECIAL REQUESTS Culture result:    
  GRAM NEGATIVE RODS GROWING IN BOTH BOTTLES DRAWN (SITE = LFA) REFER TO [facility] [id number] FOR ID AND SENSITIVITIES  
 CULTURE, BLOOD [[id number]]  (Abnormal)  (Susceptibility) Collected:  08/27/20 1922 Order Status:  Completed Specimen:  Blood Updated:  08/30/20 1033 Special Requests: NO SPECIAL REQUESTS Culture result:    
  ESCHERICHIA COLI GROWING IN BOTH BOTTLES DRAWN (SITES = R AC) MYCOPLASMA AB, IGM [[id number]] Collected:  08/29/20 0039 Order Status:  Completed Specimen:  Serum Updated:  08/29/20 1124 Mycoplasma Ab, IgM NONREACTIVE     
 RESPIRATORY PANEL,PCR,NASOPHARYNGEAL [[id number]] Collected:  08/28/20 1829 Order Status:  Completed Specimen:  Nasopharyngeal Updated:  08/29/20 0002 Adenovirus Not detected Coronavirus 229E Not detected Coronavirus HKU1 Not detected Coronavirus CVNL63 Not detected Coronavirus OC43 Not detected Metapneumovirus Not detected Rhinovirus and Enterovirus Not detected Influenza A Not detected Influenza A, subtype H1 Not detected Influenza A, subtype H3 Not detected INFLUENZA A H1N1 PCR Not detected Influenza B Not detected Parainfluenza 1 Not detected Parainfluenza 2 Not detected Parainfluenza 3 Not detected Parainfluenza virus 4 Not detected RSV by PCR Not detected B. parapertussis, PCR Not detected Bordetella pertussis - PCR Not detected Chlamydophila pneumoniae DNA, QL, PCR Not detected Mycoplasma pneumoniae DNA, QL, PCR Not detected MICRO TRACKING [[id number]] Collected:  08/27/20 1922 Order Status:  Completed Updated:  08/28/20 2924 MICRO TRACKING [[id number]] Collected:  08/27/20 1922 Order Status:  Completed Updated:  08/28/20 1537 Verta  [[id number]] Collected:  08/27/20 1859 Order Status:  Completed Updated:  08/28/20 6222 Verta  [[id number]] Collected:  08/27/20 1859 Order Status:  Completed Updated:  08/28/20 0540 CULTURE, RESPIRATORY/SPUTUM/BRONCH Bertram Inocente STAIN [[id number]] Order Status:  Canceled Specimen:  Sputum URINE CULTURE HOLD SAMPLE [[id number]] Collected:  08/27/20 2000 Order Status:  Completed Specimen:  Urine from Serum Updated:  08/27/20 2012 Urine culture hold Urine on hold in Microbiology dept for 2 days. If unpreserved urine is submitted, it cannot be used for addtional testing after 24 hours, recollection will be required. Other pertinent lab: none Total time spent with patient: 39 Minutes I personally reviewed chart, notes, data and current medications in the medical record. I have personally examined and treated the patient at bedside during this period. Care Plan discussed with: Patient, Care Manager, Nursing Staff and >50% of time spent in counseling and coordination of care Discussed:  Care Plan and D/C Planning Prophylaxis:  H2B/PPI Disposition:  SNF/LTC 
        
___________________________________________________ Attending Physician: Perez Nam MD

## 2020-09-08 NOTE — PROGRESS NOTES
Transition of Care Plan to SNF/Rehab 
 
SNF/Rehab Transition: 
Patient has been accepted to [address] [place] [address] and Rehab and meets criteria for admission. Patient will transported by [facility] and expected to leave at 1:50 PM. Communication to Patient/Family: Met with patient and Radha Creed daughter  (identified care giver) and they are agreeable to the transition plan. Communication to SNF/Rehab: 
Bedside RN, Wesley Rm , has been notified to update the transition plan to the facility and call report (phone number [phone]). Discharge information has been updated on the AVS. Discharge instructions to be fax'd to facility at [facility] # [phone]).  112 Nursing Please include all hard scripts for controlled substances, med rec and dc summary, and AVS in packet. Reviewed and confirmed with facility, [address] [place] [address], can manage the patient care needs for the following:  
  
 
Scott Torres with (X) only those applicable: 
 
Medication: 
[]  Medications will be available at the facility []  IV Antibiotics []  Controlled Substance - hard copy to be sent with patient  
[]  Weekly Labs Documents: 
[x] Hard RX [x] MAR [x] Kardex [x] AVS 
[x]Transfer Summary 
[x]Discharge Equipment: 
[]  CPAP/BiPAP []  Wound Vacuum 
[]  [place] or Urinary Device 
[]  PICC/Central Line 
[]  Nebulizer 
[]  Ventilator Treatment: 
[]Isolation (for MRSA, VRE, etc.) []Surgical Drain Management []Tracheostomy Care 
[]Dressing Changes []Dialysis with transportation and chair time. []PEG Care []Oxygen []Daily Weights for Heart Failure Dietary: 
[]Any diet limitations []Tube Feedings []Total Parenteral Management (TPN) Eligible for Medicaid Long Term Services and Supports Yes: 
[] Eligible for medical assistance or will become eligible within 180 days and UAI completed. [] Provider/Patient and/or support system has requested screening. [] UAI copy provided to patient or responsible party. [] UAI unavailable at discharge will send once processed to SNF provider. [] UAI unavailable at discharged mailed to patient No:  
[] Private pay and is not financially eligible for Medicaid within the next 180 days. [] Reside out-of-state. [] A residents of a state owned/operated facility that is licensed 
by [address] or [facility] 
[] Enrollment in [facility] services 
[] [address] 
[] Patient /Family declines to have screening completed or provide financial information for screening Financial Resources: 
Medicaid   
[] Initiated and application pending  
[] Full coverage Advanced Care Plan: 
[]Surrogate Decision Maker of Care 
[]POA []Communicated Code Status DNR (DDNR\", \"Full\") Other Care Management Interventions PCP Verified by CM: Yes(Dr. Mondragon Sat) Mode of Transport at Discharge: Self Current Support Network: Lives Alone Confirm Follow Up Transport: Family The Plan for Transition of Care is Related to the Following Treatment Goals : Bilateral pneumonia Discharge Location Discharge Placement: Skilled nursing facility [facility]

## 2020-09-17 NOTE — TELEPHONE ENCOUNTER
Daughter called with concern over father appt. Family was told not to come see Dr. Vicente Marquez until patient has had his Kidney BX. They now are going to see Dr. Radha Staples on the 9/23 for a consultation. Patient appt with Dr. Vicente Marquez is on the 24th. . She feel like they need to reschedule due to what Alejandro Live ( NP) had instructed before discharge. . Please advise       # [phone]

## 2020-09-22 NOTE — TELEPHONE ENCOUNTER
Isaías Yoo daughter left a vm message   Wanting to talk with nurse   In regards to kidney doc tomorrow  And doing biopsy first or not before coming here

## 2020-10-19 NOTE — TELEPHONE ENCOUNTER
[address] at [facility]  [phone]        10/19/20 3:57 PM Called Dr. Ej Hurst office to request records including most recent office note and kidney biopsy pathology. Per Kai Bussing in medical records, patient cancelled out patient follow up and has not rescheduled at this time. Per chart review, patient had been scheduled for bone scan 09/28, but cancelled due to hospitalization. Attempted to call patient's daughter, Steve Norman. No answer, left message requesting return call. Provided office phone number as well. 10/20/20 12:13 PM Called and spoke with patient's daughter, Steve Norman. She states that patient was discharged home from rehab with Hospice care. Per Steve Norman, she has been advised that patient is transitioning. Offered supportive listening and informed Steve Pattoning this nurse would update Dr. Marina Scruggs of above. Cancelled appointment as well. No further questions or concerns at this time.

## 2023-02-03 NOTE — TELEPHONE ENCOUNTER
Woody Salguero left a  returning call to [facility] PAST SURGICAL HISTORY:  No significant past surgical history

## 2024-01-31 NOTE — PROGRESS NOTES
-- DO NOT REPLY / DO NOT REPLY ALL --  -- Message is from [facility] [employer] ([facility]) --    Offered Waitlist if Available for the Visit Type? No    Caller is requesting an appointment - at a sooner time than what was available.      Caller wants sooner appointment - offered other approved options    Reason for Visit: discuss scheduling for study and surgery for carpel tunnel, carpel tunnel getting significantly worse    Is the patient currently scheduled? No    Preferred time to be seen: any time     Caller Information         Type Contact Phone/Fax    01/31/2024 11:18 AM CST Phone (Incoming) Roberto Carlos Carnes (Self) [phone] (M)            Alternative phone number: n/a    Can a detailed message be left? Yes    Message Turnaround:    Shift Summary 1930: Bedside and Verbal shift change report given to Edu Moore RN (oncoming nurse) by Nancy Choe RN and Bairon Diego RN (offgoing nurse). Report included the following information SBAR, Kardex, Procedure Summary, Intake/Output, MAR, Accordion, Recent Results and Cardiac Rhythm NSR.  
 
2210: Inserted 2nd peripheral IV for multiple abx administration. Condom catheter leaking/failing once again; removed. Will perform q2 hour incontinence checks, offer urinal with rounding. 2310: 2nd covid test resulted negative. Informed Dr. Dave Rashid who took patient off precautions. Started MRI checklist for AM.- will confirm information with daughter. SCDs initated- educated on roll in preventing blood clots in the legs (DVTs). 0130: Patient having trouble sleeping. Has a wet, congested cough but not producing sputum. Orthopnic and dyspnic with minimal exertion, but maintaining o2 saturation above 93% on room air. 
 
0300: O2 in low 90s, placed on 2L NC.  
 
0530: O2 increased to 4L, humidified. 0730: Bedside and Verbal shift change report given to Preethi Khalil RN (oncoming nurse) by Edu Moore RN (offgoing nurse). Report included the following information SBAR, Kardex, Procedure Summary, Intake/Output, MAR, Accordion, Recent Results and Cardiac Rhythm NSR. When at bedisde for report, patient had O2 out, saturation in 80s, patient said he felt light headed and ill and like he couldn't go to MRI. Placed back on oxygen, encouraged to take deep breaths and try to relax. 0800: MRI tech called advising patient will not be able to complete MRI if not able to hold breath for 15 second periods. Spoke with Dr. Benny Caruso- one time 40mg IV lasix ordered

## 2025-02-13 NOTE — DISCHARGE INSTRUCTIONS
HOSPITALIST DISCHARGE INSTRUCTIONS  NAME: Kenya Lo   :  1933   MRN:  [id number]     Date/Time:  2020 11:03 AM    ADMIT DATE: 2020     DISCHARGE DATE: 2020     PRINCIPAL DISCHARGE DIAGNOSES:  Renal mass, concerning for renal cell carcinoma  UTI (urinary tract infection)  Bacteremia (bloodstream infection)    MEDICATIONS:  · It is important that you take the medication exactly as they are prescribed. Note the changes and additions to your medications. Be sure you understand these changes before you are discharged today. · Keep your medication in the bottles provided by the pharmacist and keep a list of the medication names, dosages, and times to be taken in your wallet. · Do not take other medications without consulting your doctor. Pain Management: per above medications    What to do at Home    Recommended diet:  Resume previous diet    Recommended activity: PT/OT Eval and Treat, fall precautions    If you experience any of the following symptoms then please call your primary care physician or return to the emergency room if you cannot get hold of your doctor:  Fever, chills, severe abdominal pain, nausea, vomiting, diarrhea, confusion, weakness, falling, bleeding, severe chest pain, shortness of breath, or other severe concerning symptoms    Follow Up:   Follow-up Information     Follow up With Specialties Details Why [address] [facility]  On 9/15/2020 Dr. Nestor Emerson at 8:30 [address]  Denver Casas MD Hematology and Oncology, Internal Medicine, Hematology, Oncology Go on 2020 appt at 2 pm; need to have Bone Scan done prior to appt; please call our office for further questions [address]  [address]      [address]   [address] [facility]  [phone]            Information obtained by :  I understand that if any problems occur once I am at home I am to contact my physician. I understand and acknowledge receipt of the instructions indicated above.                                                                                                                                            Physician's or R.N.'s Signature                                                                  Date/Time                                                                                                                                              Patient or Representative Signature                                                          Date/Time Vomiting and diarrhea since last night